# Patient Record
Sex: MALE | Race: WHITE | NOT HISPANIC OR LATINO | URBAN - METROPOLITAN AREA
[De-identification: names, ages, dates, MRNs, and addresses within clinical notes are randomized per-mention and may not be internally consistent; named-entity substitution may affect disease eponyms.]

---

## 2021-06-02 ENCOUNTER — INPATIENT (INPATIENT)
Facility: HOSPITAL | Age: 46
LOS: 3 days | Discharge: HOME | DRG: 64 | End: 2021-06-06
Attending: PSYCHIATRY & NEUROLOGY | Admitting: PSYCHIATRY & NEUROLOGY
Payer: COMMERCIAL

## 2021-06-02 VITALS
WEIGHT: 190.26 LBS | OXYGEN SATURATION: 96 % | DIASTOLIC BLOOD PRESSURE: 140 MMHG | HEART RATE: 108 BPM | RESPIRATION RATE: 18 BRPM | SYSTOLIC BLOOD PRESSURE: 229 MMHG | TEMPERATURE: 99 F

## 2021-06-02 LAB
ALBUMIN SERPL ELPH-MCNC: 4.5 G/DL — SIGNIFICANT CHANGE UP (ref 3.3–5)
ALP SERPL-CCNC: 107 U/L — SIGNIFICANT CHANGE UP (ref 40–120)
ALT FLD-CCNC: 68 U/L — HIGH (ref 10–45)
ANION GAP SERPL CALC-SCNC: 14 MMOL/L — SIGNIFICANT CHANGE UP (ref 5–17)
APTT BLD: 31 SEC — SIGNIFICANT CHANGE UP (ref 27.5–35.5)
AST SERPL-CCNC: 30 U/L — SIGNIFICANT CHANGE UP (ref 10–40)
BASOPHILS # BLD AUTO: 0.05 K/UL — SIGNIFICANT CHANGE UP (ref 0–0.2)
BASOPHILS NFR BLD AUTO: 0.5 % — SIGNIFICANT CHANGE UP (ref 0–2)
BILIRUB SERPL-MCNC: 0.4 MG/DL — SIGNIFICANT CHANGE UP (ref 0.2–1.2)
BUN SERPL-MCNC: 14 MG/DL — SIGNIFICANT CHANGE UP (ref 7–23)
CALCIUM SERPL-MCNC: 9.5 MG/DL — SIGNIFICANT CHANGE UP (ref 8.4–10.5)
CHLORIDE SERPL-SCNC: 102 MMOL/L — SIGNIFICANT CHANGE UP (ref 96–108)
CHOLEST SERPL-MCNC: 243 MG/DL — HIGH
CO2 SERPL-SCNC: 26 MMOL/L — SIGNIFICANT CHANGE UP (ref 22–31)
CREAT SERPL-MCNC: 1.01 MG/DL — SIGNIFICANT CHANGE UP (ref 0.5–1.3)
EOSINOPHIL # BLD AUTO: 0.17 K/UL — SIGNIFICANT CHANGE UP (ref 0–0.5)
EOSINOPHIL NFR BLD AUTO: 1.9 % — SIGNIFICANT CHANGE UP (ref 0–6)
GLUCOSE SERPL-MCNC: 101 MG/DL — HIGH (ref 70–99)
HCT VFR BLD CALC: 46.2 % — SIGNIFICANT CHANGE UP (ref 39–50)
HDLC SERPL-MCNC: 44 MG/DL — SIGNIFICANT CHANGE UP
HGB BLD-MCNC: 14.9 G/DL — SIGNIFICANT CHANGE UP (ref 13–17)
IMM GRANULOCYTES NFR BLD AUTO: 0.4 % — SIGNIFICANT CHANGE UP (ref 0–1.5)
INR BLD: 1.04 — SIGNIFICANT CHANGE UP (ref 0.88–1.16)
LIPID PNL WITH DIRECT LDL SERPL: 161 MG/DL — HIGH
LYMPHOCYTES # BLD AUTO: 2.94 K/UL — SIGNIFICANT CHANGE UP (ref 1–3.3)
LYMPHOCYTES # BLD AUTO: 32.2 % — SIGNIFICANT CHANGE UP (ref 13–44)
MCHC RBC-ENTMCNC: 27.1 PG — SIGNIFICANT CHANGE UP (ref 27–34)
MCHC RBC-ENTMCNC: 32.3 GM/DL — SIGNIFICANT CHANGE UP (ref 32–36)
MCV RBC AUTO: 84.2 FL — SIGNIFICANT CHANGE UP (ref 80–100)
MONOCYTES # BLD AUTO: 0.73 K/UL — SIGNIFICANT CHANGE UP (ref 0–0.9)
MONOCYTES NFR BLD AUTO: 8 % — SIGNIFICANT CHANGE UP (ref 2–14)
NEUTROPHILS # BLD AUTO: 5.2 K/UL — SIGNIFICANT CHANGE UP (ref 1.8–7.4)
NEUTROPHILS NFR BLD AUTO: 57 % — SIGNIFICANT CHANGE UP (ref 43–77)
NON HDL CHOLESTEROL: 199 MG/DL — HIGH
NRBC # BLD: 0 /100 WBCS — SIGNIFICANT CHANGE UP (ref 0–0)
PLATELET # BLD AUTO: 272 K/UL — SIGNIFICANT CHANGE UP (ref 150–400)
POTASSIUM SERPL-MCNC: 3.8 MMOL/L — SIGNIFICANT CHANGE UP (ref 3.5–5.3)
POTASSIUM SERPL-SCNC: 3.8 MMOL/L — SIGNIFICANT CHANGE UP (ref 3.5–5.3)
PROT SERPL-MCNC: 7.6 G/DL — SIGNIFICANT CHANGE UP (ref 6–8.3)
PROTHROM AB SERPL-ACNC: 12.5 SEC — SIGNIFICANT CHANGE UP (ref 10.6–13.6)
RBC # BLD: 5.49 M/UL — SIGNIFICANT CHANGE UP (ref 4.2–5.8)
RBC # FLD: 13.1 % — SIGNIFICANT CHANGE UP (ref 10.3–14.5)
SARS-COV-2 RNA SPEC QL NAA+PROBE: SIGNIFICANT CHANGE UP
SODIUM SERPL-SCNC: 142 MMOL/L — SIGNIFICANT CHANGE UP (ref 135–145)
TRIGL SERPL-MCNC: 190 MG/DL — HIGH
WBC # BLD: 9.13 K/UL — SIGNIFICANT CHANGE UP (ref 3.8–10.5)
WBC # FLD AUTO: 9.13 K/UL — SIGNIFICANT CHANGE UP (ref 3.8–10.5)

## 2021-06-02 PROCEDURE — 70496 CT ANGIOGRAPHY HEAD: CPT | Mod: 26,MC

## 2021-06-02 PROCEDURE — 70498 CT ANGIOGRAPHY NECK: CPT | Mod: 26,MC

## 2021-06-02 PROCEDURE — 71045 X-RAY EXAM CHEST 1 VIEW: CPT | Mod: 26

## 2021-06-02 PROCEDURE — 99291 CRITICAL CARE FIRST HOUR: CPT

## 2021-06-02 PROCEDURE — 70450 CT HEAD/BRAIN W/O DYE: CPT | Mod: 26,76,59

## 2021-06-02 PROCEDURE — 99255 IP/OBS CONSLTJ NEW/EST HI 80: CPT

## 2021-06-02 PROCEDURE — 99223 1ST HOSP IP/OBS HIGH 75: CPT

## 2021-06-02 RX ORDER — ACETAMINOPHEN 500 MG
650 TABLET ORAL EVERY 6 HOURS
Refills: 0 | Status: DISCONTINUED | OUTPATIENT
Start: 2021-06-02 | End: 2021-06-03

## 2021-06-02 RX ORDER — LABETALOL HCL 100 MG
10 TABLET ORAL ONCE
Refills: 0 | Status: COMPLETED | OUTPATIENT
Start: 2021-06-02 | End: 2021-06-02

## 2021-06-02 RX ORDER — SODIUM CHLORIDE 9 MG/ML
1000 INJECTION INTRAMUSCULAR; INTRAVENOUS; SUBCUTANEOUS
Refills: 0 | Status: DISCONTINUED | OUTPATIENT
Start: 2021-06-02 | End: 2021-06-03

## 2021-06-02 RX ORDER — LABETALOL HCL 100 MG
20 TABLET ORAL ONCE
Refills: 0 | Status: COMPLETED | OUTPATIENT
Start: 2021-06-02 | End: 2021-06-02

## 2021-06-02 RX ORDER — NICARDIPINE HYDROCHLORIDE 30 MG/1
5 CAPSULE, EXTENDED RELEASE ORAL
Qty: 40 | Refills: 0 | Status: DISCONTINUED | OUTPATIENT
Start: 2021-06-02 | End: 2021-06-02

## 2021-06-02 RX ORDER — LEVETIRACETAM 250 MG/1
500 TABLET, FILM COATED ORAL EVERY 12 HOURS
Refills: 0 | Status: DISCONTINUED | OUTPATIENT
Start: 2021-06-02 | End: 2021-06-03

## 2021-06-02 RX ORDER — LEVETIRACETAM 250 MG/1
500 TABLET, FILM COATED ORAL EVERY 12 HOURS
Refills: 0 | Status: DISCONTINUED | OUTPATIENT
Start: 2021-06-02 | End: 2021-06-02

## 2021-06-02 RX ADMIN — Medication 10 MILLIGRAM(S): at 14:58

## 2021-06-02 RX ADMIN — Medication 20 MILLIGRAM(S): at 13:55

## 2021-06-02 RX ADMIN — NICARDIPINE HYDROCHLORIDE 25 MG/HR: 30 CAPSULE, EXTENDED RELEASE ORAL at 14:22

## 2021-06-02 RX ADMIN — Medication 10 MILLIGRAM(S): at 14:43

## 2021-06-02 RX ADMIN — LEVETIRACETAM 400 MILLIGRAM(S): 250 TABLET, FILM COATED ORAL at 17:46

## 2021-06-02 RX ADMIN — SODIUM CHLORIDE 75 MILLILITER(S): 9 INJECTION INTRAMUSCULAR; INTRAVENOUS; SUBCUTANEOUS at 18:09

## 2021-06-02 NOTE — H&P ADULT - HISTORY OF PRESENT ILLNESS
This is a 46M with a history of hypertension who presented to the ED after experiencing sudden onset of left arm weakness and a left facial droop.  45 y/o M with PMhx of HTN, takes one medication which he does not remember the name of, no anticoagulation including no aspirin, but notes compliance with his medication. Patient also mentions the use of 4-5 beers per night. 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 EMS called in stroke notification and code stroke was called prior to arrival, taken immediately to CT scan. As per patient denies headache, blurry vison. Notes more trouble moving LUE than moving LLE, no neck pain, fever, infectious symptoms. Denies drug use.

## 2021-06-02 NOTE — CONSULT NOTE ADULT - ASSESSMENT
Pt is a 47 yo M w/ PMH of HTN (on unknown medication), who presented to ED BIBEMS prenotification for LVO stroke code for slurred speech, left sided weakness, and pt noted not to be at baseline by pt's co workers, symptoms started at 1325. Pt to be super hypertensive to the 220s/110s and was started on cardene drip. Neuro sx consulted and evaluated pt. Pt will be admitted under neurosurgery. HCT showed 3.3 cm hematoma centered in the R basal ganglia and age indeterminate R caudate head infarct. CTA negative.       recommend:    1)Secondary stroke prevention  - start Atorvastatin 80mg PO daily  -no anticoagulation/antiplatelets/DVT chemophrophylaxis at this time due to hemorrhage     2) Stroke risk factors  - A1C: obtain   - LDL: 161  - HTN    3) Further management  - continue cardene drip to maintain SBP goal <140  - recommend q1 hr stroke neuro checks  - may need outpt neurology follow up  - provide stroke education    Case seen and discussed with attending physician  Pt is a 45 yo M w/ PMH of HTN (on unknown medication), who presented to ED BIBEMS prenotification for LVO stroke code for slurred speech, left sided weakness, and pt noted not to be at baseline by pt's co workers, symptoms started at 1325. Pt to be super hypertensive to the 220s/110s and was started on cardene drip. Neuro sx consulted and evaluated pt. Pt will be admitted under neurosurgery. HCT showed 3.3 cm hematoma centered in the R basal ganglia and age indeterminate R caudate head infarct. CTA negative.   recommend:    1)Secondary stroke prevention  - start Atorvastatin 80mg PO daily  -no anticoagulation/antiplatelets/DVT chemophrophylaxis at this time due to hemorrhage     2) Stroke risk factors  - A1C: obtain   - LDL: 161  - HTN    3) Further management  - continue cardene drip to maintain SBP goal <140  - recommend q1 hr stroke neuro checks  - may need outpt neurology follow up  - provide stroke education  - appreciate neurosurgery recs    Thank you for allowing us to participate in the care of this patient, please call Neurology with questions as needed.     Case seen and discussed with attending physician  Pt is a 47 yo M w/ PMH of HTN (on unknown medication), who presented to ED BIBEMS prenotification for LVO stroke code for slurred speech, left sided weakness, and pt noted not to be at baseline by pt's co workers, symptoms started at 1325. Pt to be super hypertensive to the 220s/110s and was started on cardene drip. Neuro sx consulted and evaluated pt. Pt will be admitted under neurosurgery. HCT showed 3.3 cm hematoma centered in the R basal ganglia and age indeterminate R caudate head infarct. CTA negative.   recommend:    Right putamen hemorrhage from uncontrolled HTN, asymptomatic old right caudate lacune, b/l V4 mild stenosis/athero    1) Management per primary neurosurgery team  - continue cardene drip to maintain SBP goal <140  - recommend q1 hr stroke neuro checks  - check TTE     2)Secondary stroke prevention  - start Atorvastatin 80mg PO daily  -no anticoagulation/antiplatelets/DVT chemophrophylaxis at this time due to hemorrhage (when cleared from neurosurgical standpoint-possibly in 1 week-patient needs to be started on aspirin 81 for prior ischemic stroke & b/l vertebral artery mild stenosis)    3) Stroke risk factors  - A1C: obtain   - LDL: 161  - HTN  - provide stroke education  - will need outpt neurology follow up  - OP vascular risk factor mgt with PCP (goal BP<120/80 with ACEI+/-CCB’s, LDL bet 50 & 70, A1C<7.0, avoid EtOH)    Thank you for allowing us to participate in the care of this patient, will follow intermittently, please call Neurology with questions as needed.     Case seen and discussed with attending physician

## 2021-06-02 NOTE — H&P ADULT - NSHPPHYSICALEXAM_GEN_ALL_CORE
General: Laying in bed. Uncomfortable appearing.   Eyes: PERRL. EOMI bilaterally   Neck: Supple  Lungs: CTAB  Heart: RRR. S1, S2  Abdomen: Soft, NT ND +BS  Ext: 2+ pulses and perfusing well  Skin: No rash  Neuro: AAOx 3, LEOS x4 with full 5/5 strength upper and lower extremities throughout. +L pronator drift +L facial. LUE diminished sensation to light touch.

## 2021-06-02 NOTE — ED PROVIDER NOTE - OBJECTIVE STATEMENT
47 y/o M with PMhx of HTN, takes one medication which he does not remember the name of, no anticoagulation including no aspirin, but notes compliance with his medication. Patient also mentions the use of 4-5 beers per night. 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 EMS called in stroke notification and code stroke was called prior to arrival, taken immediately to CT scan. As per patient denies headache, blurry vison. Notes more trouble moving LUE than moving LLE, no neck pain, fever, infectious symptoms. Denies drug use.

## 2021-06-02 NOTE — PROGRESS NOTE ADULT - ASSESSMENT
NEURO:  admit to ICU q 1 hr neuro checks  Seizure Prophylaxis: none  Activity: [x] PT [x] OT [] PMNR    PULM:  IS      CV:  SBP goal < 160 mmHg  Nicardipine gtt PRN    RENAL:  Fluids:   Subramanian for strict I/Os    GI:  Diet: dysphagia adat   GI prophylaxis [] not indicated   Bowel regimen  [x] senna [] other:    ENDO:   Goal euglycemia (-180)  Check HbA1c and lipids      HEME/ONC:  VTE prophylaxis: [x] SCDs [] chemoprophylaxis [x] hold chemoprophylaxis     ID:  Monitor for fever    SOCIAL/FAMILY:   [x] family meeting     CODE STATUS:  [x] Full Code     DISPOSITION:  [x] ICU     [x] Patient is at high risk of neurologic deterioration/death due to: increased intracranial pressure, re-hemorrhage, respiratory failure    Time spent: 45 critical care minutes

## 2021-06-02 NOTE — H&P ADULT - NSHPLABSRESULTS_GEN_ALL_CORE
CT Brain Stroke Protocol (06.02.21 @ 14:10) >    IMPRESSION:    CT Angio Head w/ IV Cont (06.02.21 @ 14:11) >    IMPRESSION: Normal CTA of the neck.

## 2021-06-02 NOTE — H&P ADULT - ATTENDING COMMENTS
Patient seen and examined by me 6/2/2021. Presents with left facial droop, dysarthria, left arm weakness. History of hypertension. CT head showing small ~10 cc right basal ganglia ICH. Plan for admit to ICU, SBP<140, a-line, CTA head to rule out vascular etiology, repeat CT head 6 hrs. No plan for surgical intervention at this time.    Yoav Ledesma M.D.

## 2021-06-02 NOTE — ED PROVIDER NOTE - NEUROLOGICAL, MLM
L facial droop, L arm 3/5 strength +2 drift, LLE 4/5 strength, +1 drift. Sensations decreased to L upper compared to R upper, although sensations equal to LE.

## 2021-06-02 NOTE — ED ADULT NURSE NOTE - OBJECTIVE STATEMENT
Pt AOX4. Pt BIBEMS for left sided facial droop, left sided decreased sensation, left sided arm drift. Pt denies fevers, chills, headache, changes in vision, n/v, SOB, chest pain, dizziness. Stroke code activated by EMS. Pt placed on cardiac monitor and brought to CT upon arrival to ED. Pt denies taking blood thinners. Pt speaking in full complete sentences. Respirations even and unlabored.

## 2021-06-02 NOTE — ED PROVIDER NOTE - PRINCIPAL DIAGNOSIS
I've placed CAM orders for B12 administration.  Okay for in clinic administration of B12.      Virginia Craig, CNP     Intracranial bleed

## 2021-06-02 NOTE — PROGRESS NOTE ADULT - SUBJECTIVE AND OBJECTIVE BOX
NEUROCRITICAL CARE PROGRESS NOTE    ISH GRAYSON   MRN-[id number]  Summary:  /  HPI:  45 y/o M with PMhx of HTN, takes one medication which he does not remember the name of, no anticoagulation including no aspirin, but notes compliance with his medication. Patient also mentions the use of 4-5 beers per night. 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 EMS called in stroke notification and code stroke was called prior to arrival, taken immediately to CT scan. As per patient denies headache, blurry vison. Notes more trouble moving LUE than moving LLE, no neck pain, fever, infectious symptoms. Denies drug use. (02 Jun 2021 15:37)          Vital Signs Last 24 Hrs  T(C): 36.2 (02 Jun 2021 18:01), Max: 37.1 (02 Jun 2021 13:56)  T(F): 97.2 (02 Jun 2021 18:01), Max: 98.7 (02 Jun 2021 13:56)  HR: 78 (02 Jun 2021 20:00) (72 - 108)  BP: 133/77 (02 Jun 2021 20:00) (118/58 - 229/140)  BP(mean): 98 (02 Jun 2021 20:00) (83 - 98)  RR: 28 (02 Jun 2021 20:00) (18 - 31)  SpO2: 93% (02 Jun 2021 20:00) (93% - 99%)        I&O's Detail    02 Jun 2021 07:01  -  02 Jun 2021 20:50  --------------------------------------------------------  IN:    IV PiggyBack: 100 mL    NiCARdipine: 50 mL    sodium chloride 0.9%: 150 mL  Total IN: 300 mL    OUT:    Voided (mL): 600 mL  Total OUT: 600 mL    Total NET: -300 mL          LABS:                        14.9   9.13  )-----------( 272      ( 02 Jun 2021 14:10 )             46.2     06-02    142  |  102  |  14  ----------------------------<  101<H>  3.8   |  26  |  1.01    Ca    9.5      02 Jun 2021 14:10    TPro  7.6  /  Alb  4.5  /  TBili  0.4  /  DBili  x   /  AST  30  /  ALT  68<H>  /  AlkPhos  107  06-02    PT/INR - ( 02 Jun 2021 14:15 )   PT: 12.5 sec;   INR: 1.04          PTT - ( 02 Jun 2021 14:15 )  PTT:31.0 sec        CAPILLARY BLOOD GLUCOSE  97 (02 Jun 2021 14:48)      POCT Blood Glucose.: 97 mg/dL (02 Jun 2021 13:53)      Drug Levels: [] N/A    CSF Analysis: [] N/A      Allergies    No Known Allergies    Intolerances      MEDICATIONS:  Antibiotics:    Neuro:  acetaminophen   Tablet .. 650 milliGRAM(s) Oral every 6 hours PRN  levETIRAcetam  IVPB 500 milliGRAM(s) IV Intermittent every 12 hours    Anticoagulation:    OTHER:    IVF:  sodium chloride 0.9%. 1000 milliLiter(s) IV Continuous <Continuous>    CULTURES:        NIHSS 3

## 2021-06-02 NOTE — ED PROVIDER NOTE - CLINICAL SUMMARY MEDICAL DECISION MAKING FREE TEXT BOX
47 y/o M with L sided weakness, facial droop, code stroke called prior to arrival. Upon arrival patient with BPs of 240s/140s, Labetalol 20 IV pushed on table prior to scan. CT scan obtained showing bleed to R basal ganglia so tpa not given 2ndary to intercranial bleed. Cardene drip started with goals to lower blood pressure to 140s. neurosurgery immediately called and at bedside, likely will admit to neurosurgery. Plan for HTN management and dispo as per neurosurg. 47 y/o M with L sided weakness, facial droop, code stroke called prior to arrival. Upon arrival patient with BPs of 240s/140s, Labetalol 20 IV pushed on table prior to scan. CT scan obtained showing bleed to R basal ganglia so tpa not given 2ndary to intercranial bleed. Cardene drip started with goals to lower blood pressure to 140s. neurosurgery immediately called and at bedside, likely will admit to neurosurgery. Plan for HTN management and dispo as per neurosurg.    Pt admitted to NS for further mgmt.

## 2021-06-02 NOTE — CONSULT NOTE ADULT - SUBJECTIVE AND OBJECTIVE BOX
**STROKE CODE CONSULT NOTE**    Last known well time/Time of onset of symptoms: 1325 6/2     HPI: Pt is a 47 yo M w/ PMH of HTN (on medication, unknown name), who presented to ED BIBEMS prenotification LVO stroke code for slurred speech, left sided weakness, and pt noted not to be at baseline by pt's co workers and stated symptoms started at 1325. Pt denied use of anticoagulation or other blood thinning medication, recent major surgery, or head trauma. In ambulance, BP was 213/117, in CT scanner BP was 229/140 so 20 mg labetalol push was given at 1357 in anticipation of Alteplase, prior to known bleed. BS 99. Pt A&O x3, and stated he was experiencing weakness in LUE, decreased sensation loss on the left side. Once bleed was confirmed, decision to start cardene drip was started. Neuro sx consulted and evaluated pt. Pt will be admitted under neurosurgery. Pt states he denies acute onset of numbness, weakness, tingling, slurred speech, blurry vision, double vision, dizziness, headache, nausea, chest pain, SOB, or other symptoms. HCT showed 3.3 cm hematoma centered in the R basal ganglia and age indeterminate R caudate head infarct. CTA negative.     T(C): --  HR: 90 (06-02-21 @ 14:53) (90 - 108)  BP: 151/71 (06-02-21 @ 14:53) (147/78 - 229/140)  RR: 18 (06-02-21 @ 14:48) (18 - 18)  SpO2: 95% (06-02-21 @ 14:53) (95% - 97%)    PAST MEDICAL & SURGICAL HISTORY:      FAMILY HISTORY:      SOCIAL HISTORY:   Patient lives with *** at ***.   Smoking status:  Drinking:  Drug Use:     ROS: ***  Constitutional: No fever, weight loss or fatigue  Eyes: No eye pain, visual disturbances, or discharge  ENMT:  No difficulty hearing, tinnitus; No sinus or throat pain  Neck: No pain or stiffness  Respiratory: No cough,   Cardiovascular: No chest pain, palpitations, shortness of breath, or leg swelling  Neurological: As per HPI      MEDICATIONS  (STANDING):  niCARdipine Infusion 5 mG/Hr (25 mL/Hr) IV Continuous <Continuous>    MEDICATIONS  (PRN):    Allergies    No Known Allergies    Intolerances      Vital Signs Last 24 Hrs  T(C): --  T(F): --  HR: 90 (02 Jun 2021 14:53) (90 - 108)  BP: 151/71 (02 Jun 2021 14:53) (147/78 - 229/140)  BP(mean): --  RR: 18 (02 Jun 2021 14:48) (18 - 18)  SpO2: 95% (02 Jun 2021 14:53) (95% - 97%)    Physical exam:  Constitutional: No acute distress, conversant  Eyes: Anicteric sclerae, moist conjunctivae, see below for CNs  Neck: trachea midline  Extremities: no edema    Neurologic:  -Mental status: Awake, alert, oriented to person, place, and time. Speech is fluent with intact naming, repetition, and comprehension, + mild dysarthria. Recent and remote memory intact. Follows commands. Attention/concentration intact.   -Cranial nerves:   II: Visual fields are full to confrontation.  III, IV, VI: Extraocular movements are intact without nystagmus. Pupils equally round and reactive to light  V:  Decreased left facial sensation V1-V3  VII: Left facial droop   VIII: Gross hearing intact   Motor: Normal bulk and tone. Left UE drift. Strength bilateral RUE 5/5, LUE 4/5, bilateral lower extremities 5/5.  Sensation: Intact to light touch bilaterally. + extinction on double simultaneous testing.  Coordination: ataxia on LUE which can be attributed to weakness     NIHSS: 5 ASPECT Score: 4 ICH Score: 0     Fingerstick Blood Glucose: CAPILLARY BLOOD GLUCOSE  97 (02 Jun 2021 14:48)      POCT Blood Glucose.: 97 mg/dL (02 Jun 2021 13:53)    LABS:                        14.9   9.13  )-----------( 272      ( 02 Jun 2021 14:10 )             46.2     06-02    142  |  102  |  14  ----------------------------<  101<H>  3.8   |  26  |  1.01    Ca    9.5      02 Jun 2021 14:10    TPro  7.6  /  Alb  4.5  /  TBili  0.4  /  DBili  x   /  AST  30  /  ALT  68<H>  /  AlkPhos  107  06-02    PT/INR - ( 02 Jun 2021 14:15 )   PT: 12.5 sec;   INR: 1.04          PTT - ( 02 Jun 2021 14:15 )  PTT:31.0 sec          RADIOLOGY & ADDITIONAL STUDIES:  < from: CT Brain Stroke Protocol (06.02.21 @ 14:10) >  IMPRESSION:  There is a 3.3 cm hematoma centered in the right basal ganglia. In addition, there is an age-indeterminate infarction right caudate head.    < end of copied text >      < from: CT Angio Neck w/ IV Cont (06.02.21 @ 14:12) >  arteriovenous shunting.    IMPRESSION: No large vessel occlusion. Mild narrowing involving the left V4 segment.    < end of copied text >  < from: CT Angio Neck w/ IV Cont (06.02.21 @ 14:12) >    IMPRESSION: Normal CTA of the neck.    < end of copied text >      -----------------------------------------------------------------------------------------------------------------  IV-tPA (Y/N):   no                                Reason IV-tPA not given: bleed         **STROKE CODE CONSULT NOTE**    Last known well time/Time of onset of symptoms: 1325 6/2     HPI: Pt is a 45 yo M w/ PMH of HTN (on medication, unknown name), ETOH use (4-5 beers nightly) who presented to ED BIBEMS prenotification LVO stroke code for slurred speech, left sided weakness. 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 On further exam he notice patient as had left facial droop and left arm weakness. Pt denied use of anticoagulation or other blood thinning medication, recent major surgery, or head trauma. In ambulance, BP was 213/117, in CT scanner BP was 229/140 so 20 mg labetalol push was given at 1357 in anticipation of Alteplase, prior to known bleed. BS 99. Pt A&O x3, and stated he was experiencing weakness in LUE, decreased sensation loss on the left side. Once bleed was confirmed, cardene drip was started. Neuro sx consulted and evaluated pt. Pt will be admitted under neurosurgery. Pt states he denies acute onset of numbness, tingling, blurry vision, double vision, dizziness, headache, nausea, chest pain, SOB, or other symptoms. HCT showed 3.3 cm hematoma centered in the R basal ganglia and age indeterminate R caudate head infarct. CTA negative.     Wife So ([phone]) was contacted by BRAXTON Castro. Discussed patient's diagnosis and prognosis, wife aware neurosurgery is on board.     T(C): --  HR: 90 (21 @ 14:53) (90 - 108)  BP: 151/71 (21 @ 14:53) (147/78 - 229/140)  RR: 18 (21 @ 14:48) (18 - 18)  SpO2: 95% (21 @ 14:53) (95% - 97%)    PAST MEDICAL & SURGICAL HISTORY:      FAMILY HISTORY:      SOCIAL HISTORY:   Patient lives with wife So at home.   Smoking status: denies  Drinkin-5 beers per night  Drug Use: denies    ROS:   Constitutional: No fever, weight loss or fatigue  Eyes: No eye pain, visual disturbances, or discharge  ENMT:  No difficulty hearing, tinnitus; No sinus or throat pain  Neck: No pain or stiffness  Respiratory: No cough,   Cardiovascular: No chest pain, palpitations, shortness of breath, or leg swelling  Neurological: As per HPI      MEDICATIONS  (STANDING):  niCARdipine Infusion 5 mG/Hr (25 mL/Hr) IV Continuous <Continuous>    MEDICATIONS  (PRN):    Allergies    No Known Allergies    Intolerances      Vital Signs Last 24 Hrs  T(C): --  T(F): --  HR: 90 (2021 14:53) (90 - 108)  BP: 151/71 (2021 14:53) (147/78 - 229/140)  BP(mean): --  RR: 18 (2021 14:48) (18 - 18)  SpO2: 95% (2021 14:53) (95% - 97%)    Physical exam:  Constitutional: No acute distress, conversant  Eyes: Anicteric sclerae, moist conjunctivae, see below for CNs  Neck: trachea midline  Extremities: no edema    Neurologic:  -Mental status: Awake, alert, oriented to person, place, and time. Speech is fluent with intact naming, repetition, and comprehension, + mild dysarthria. Recent and remote memory intact. Follows commands. Attention/concentration intact.   -Cranial nerves:   II: Visual fields are full to confrontation.  III, IV, VI: Extraocular movements are intact without nystagmus. Pupils equally round and reactive to light  V:  Decreased left facial sensation V1-V3  VII: Left facial droop   VIII: Gross hearing intact   Motor: Normal bulk and tone. Left UE drift. Strength bilateral RUE 5/5, LUE 4/5, bilateral lower extremities 5/5.  Sensation: Intact to light touch bilaterally. + extinction on double simultaneous testing.  Coordination: ataxia on LUE which can be attributed to weakness     NIHSS: 5 ASPECT Score: 4 ICH Score: 0     Fingerstick Blood Glucose: CAPILLARY BLOOD GLUCOSE  97 (2021 14:48)      POCT Blood Glucose.: 97 mg/dL (2021 13:53)    LABS:                        14.9   9.13  )-----------( 272      ( 2021 14:10 )             46.2     06-02    142  |  102  |  14  ----------------------------<  101<H>  3.8   |  26  |  1.01    Ca    9.5      2021 14:10    TPro  7.6  /  Alb  4.5  /  TBili  0.4  /  DBili  x   /  AST  30  /  ALT  68<H>  /  AlkPhos  107  06-02    PT/INR - ( 2021 14:15 )   PT: 12.5 sec;   INR: 1.04          PTT - ( 2021 14:15 )  PTT:31.0 sec          RADIOLOGY & ADDITIONAL STUDIES:  < from: CT Brain Stroke Protocol (21 @ 14:10) >  IMPRESSION:  There is a 3.3 cm hematoma centered in the right basal ganglia. In addition, there is an age-indeterminate infarction right caudate head.    < end of copied text >      < from: CT Angio Neck w/ IV Cont (21 @ 14:12) >  arteriovenous shunting.    IMPRESSION: No large vessel occlusion. Mild narrowing involving the left V4 segment.    < end of copied text >  < from: CT Angio Neck w/ IV Cont (21 @ 14:12) >    IMPRESSION: Normal CTA of the neck.    < end of copied text >      -----------------------------------------------------------------------------------------------------------------  IV-tPA (Y/N):   no                                Reason IV-tPA not given: bleed         **STROKE CODE CONSULT NOTE**    Last known well time/Time of onset of symptoms: 1325 6/2     HPI: Pt is a 47 yo M w/ PMH of HTN (on medication, unknown name), ETOH use (4-5 beers nightly) who presented to ED BIBEMS prenotification LVO stroke code for slurred speech, left sided weakness. 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 On further exam he notice patient as had left facial droop and left arm weakness. Pt denied use of anticoagulation or other blood thinning medication, recent major surgery, or head trauma. In ambulance, BP was 213/117, in CT scanner BP was 229/140 so 20 mg labetalol push was given at 1357 in anticipation of Alteplase, prior to known bleed. BS 99. Pt A&O x3, and stated he was experiencing weakness in LUE, decreased sensation loss on the left side. Once bleed was confirmed, cardene drip was started. Neuro sx consulted and evaluated pt. Pt will be admitted under neurosurgery. Pt states he denies acute onset of numbness, tingling, blurry vision, double vision, dizziness, headache, nausea, chest pain, SOB, or other symptoms. HCT showed 3.3 cm hematoma centered in the R basal ganglia and age indeterminate R caudate head infarct. CTA negative.     Wife So ([phone]) was contacted by BRAXTON Castro. Discussed patient's diagnosis and prognosis, wife aware neurosurgery is on board.     T(C): --  HR: 90 (21 @ 14:53) (90 - 108)  BP: 151/71 (21 @ 14:53) (147/78 - 229/140)  RR: 18 (21 @ 14:48) (18 - 18)  SpO2: 95% (21 @ 14:53) (95% - 97%)    PAST MEDICAL & SURGICAL HISTORY:      FAMILY HISTORY:      SOCIAL HISTORY:   Patient lives with wife So at home.   Smoking status: denies  Drinkin-5 beers per night  Drug Use: denies    ROS:   Constitutional: No fever, weight loss or fatigue  Eyes: No eye pain, visual disturbances, or discharge  ENMT:  No difficulty hearing, tinnitus; No sinus or throat pain  Neck: No pain or stiffness  Respiratory: No cough,   Cardiovascular: No chest pain, palpitations, shortness of breath, or leg swelling  Neurological: As per HPI      MEDICATIONS  (STANDING):  niCARdipine Infusion 5 mG/Hr (25 mL/Hr) IV Continuous <Continuous>    MEDICATIONS  (PRN):    Allergies    No Known Allergies    Intolerances      Vital Signs Last 24 Hrs  T(C): --  T(F): --  HR: 90 (2021 14:53) (90 - 108)  BP: 151/71 (2021 14:53) (147/78 - 229/140)  BP(mean): --  RR: 18 (2021 14:48) (18 - 18)  SpO2: 95% (2021 14:53) (95% - 97%)    Physical exam:  Constitutional: No acute distress, conversant  Eyes: Anicteric sclerae, moist conjunctivae, see below for CNs  Neck: trachea midline  Extremities: no edema    Neurologic:  -Mental status: Awake, alert, oriented to person, place, and time. Speech is fluent with intact naming, repetition, and comprehension, + mild dysarthria. Recent and remote memory intact. Follows commands. Attention/concentration intact.   -Cranial nerves:   II: Visual fields are full to confrontation.  III, IV, VI: Extraocular movements are intact without nystagmus. Pupils equally round and reactive to light  V:  Decreased left facial sensation V1-V3  VII: Left facial droop   VIII: Gross hearing intact   Motor: Normal bulk and tone. Left UE drift. Strength bilateral RUE 5/5, LUE 4/5, bilateral lower extremities 5/5.  Sensation: Intact to light touch bilaterally. + extinction on double simultaneous testing.  Coordination: ataxia on LUE which can be attributed to weakness     NIHSS: 5 ASPECT Score: 9 ICH Score: 0     Fingerstick Blood Glucose: CAPILLARY BLOOD GLUCOSE  97 (2021 14:48)      POCT Blood Glucose.: 97 mg/dL (2021 13:53)    LABS:                        14.9   9.13  )-----------( 272      ( 2021 14:10 )             46.2     06-02    142  |  102  |  14  ----------------------------<  101<H>  3.8   |  26  |  1.01    Ca    9.5      2021 14:10    TPro  7.6  /  Alb  4.5  /  TBili  0.4  /  DBili  x   /  AST  30  /  ALT  68<H>  /  AlkPhos  107  06-02    PT/INR - ( 2021 14:15 )   PT: 12.5 sec;   INR: 1.04          PTT - ( 2021 14:15 )  PTT:31.0 sec          RADIOLOGY & ADDITIONAL STUDIES:  < from: CT Brain Stroke Protocol (21 @ 14:10) >  IMPRESSION:  There is a 3.3 cm hematoma centered in the right basal ganglia. In addition, there is an age-indeterminate infarction right caudate head.    < end of copied text >      < from: CT Angio Neck w/ IV Cont (21 @ 14:12) >  arteriovenous shunting.    IMPRESSION: No large vessel occlusion. Mild narrowing involving the left V4 segment.    < end of copied text >  < from: CT Angio Neck w/ IV Cont (21 @ 14:12) >    IMPRESSION: Normal CTA of the neck.    < end of copied text >      -----------------------------------------------------------------------------------------------------------------  IV-tPA (Y/N):   no                                Reason IV-tPA not given: bleed         **STROKE CODE CONSULT NOTE**    Last known well time/Time of onset of symptoms: 1325 6/2     HPI: Pt is a 45 yo M w/ PMH of HTN (on medication, unknown name), ETOH use (4-5 beers nightly) who presented to ED BIBEMS prenotification LVO stroke code for slurred speech, left sided weakness. 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 On further exam he notice patient as had left facial droop and left arm weakness. Pt denied use of anticoagulation or other blood thinning medication, recent major surgery, or head trauma. In ambulance, BP was 213/117, in CT scanner BP was 229/140 so 20 mg labetalol push was given at 1357 in anticipation of Alteplase(not given due to bleed on CTH), prior to known bleed. BS 99. Pt A&O x3, and stated he was experiencing weakness in LUE, decreased sensation loss on the left side. Once bleed was confirmed, cardene drip was started. Neuro sx consulted and evaluated pt. Pt will be admitted under neurosurgery. Pt states he denies acute onset of numbness, tingling, blurry vision, double vision, dizziness, headache, nausea, chest pain, SOB, or other symptoms. HCT showed 3.3 cm hematoma centered in the R basal ganglia and age indeterminate R caudate head infarct. CTA negative.     Wife So ([phone]) was contacted by BRAXTON Castro. Discussed patient's diagnosis and prognosis, wife aware neurosurgery is on board.     T(C): --  HR: 90 (21 @ 14:53) (90 - 108)  BP: 151/71 (21 @ 14:53) (147/78 - 229/140)  RR: 18 (21 @ 14:48) (18 - 18)  SpO2: 95% (21 @ 14:53) (95% - 97%)    PAST MEDICAL & SURGICAL HISTORY:      FAMILY HISTORY:      SOCIAL HISTORY:   Patient lives with wife So at home.   Smoking status: denies  Drinkin-5 beers per night  Drug Use: denies    ROS:   Constitutional: No fever, weight loss or fatigue  Eyes: No eye pain, visual disturbances, or discharge  ENMT:  No difficulty hearing, tinnitus; No sinus or throat pain  Neck: No pain or stiffness  Respiratory: No cough,   Cardiovascular: No chest pain, palpitations, shortness of breath, or leg swelling  Neurological: As per HPI      MEDICATIONS  (STANDING):  niCARdipine Infusion 5 mG/Hr (25 mL/Hr) IV Continuous <Continuous>    MEDICATIONS  (PRN):    Allergies    No Known Allergies    Intolerances      Vital Signs Last 24 Hrs  T(C): --  T(F): --  HR: 90 (2021 14:53) (90 - 108)  BP: 151/71 (2021 14:53) (147/78 - 229/140)  BP(mean): --  RR: 18 (2021 14:48) (18 - 18)  SpO2: 95% (2021 14:53) (95% - 97%)    Physical exam:  Constitutional: No acute distress, conversant  Eyes: Anicteric sclerae, moist conjunctivae, see below for CNs  Neck: trachea midline  Extremities: no edema    Neurologic:  -Mental status: Awake, alert, oriented to person, place, and time. Speech is fluent with intact naming, repetition, and comprehension, + mild dysarthria. Recent and remote memory intact. Follows commands. Attention/concentration intact.   -Cranial nerves:   II: Visual fields are full to confrontation.  III, IV, VI: Extraocular movements are intact without nystagmus. Pupils equally round and reactive to light  V:  Decreased left facial sensation V1-V3  VII: Left facial droop   VIII: Gross hearing intact   Motor: Normal bulk and tone. Left UE drift. Strength bilateral RUE 5/5, LUE 4/5, bilateral lower extremities 5/5.  Sensation: Intact to light touch bilaterally. + extinction on double simultaneous testing.  Coordination: ataxia on LUE which can be attributed to weakness     NIHSS: 5 ASPECT Score: 9 ICH Score: 0     Fingerstick Blood Glucose: CAPILLARY BLOOD GLUCOSE  97 (2021 14:48)      POCT Blood Glucose.: 97 mg/dL (2021 13:53)    LABS:                        14.9   9.13  )-----------( 272      ( 2021 14:10 )             46.2     06-02    142  |  102  |  14  ----------------------------<  101<H>  3.8   |  26  |  1.01    Ca    9.5      2021 14:10    TPro  7.6  /  Alb  4.5  /  TBili  0.4  /  DBili  x   /  AST  30  /  ALT  68<H>  /  AlkPhos  107  06-02    PT/INR - ( 2021 14:15 )   PT: 12.5 sec;   INR: 1.04          PTT - ( 2021 14:15 )  PTT:31.0 sec          RADIOLOGY & ADDITIONAL STUDIES:  < from: CT Brain Stroke Protocol (21 @ 14:10) >  IMPRESSION:  There is a 3.3 cm hematoma centered in the right basal ganglia. In addition, there is an age-indeterminate infarction right caudate head.    < end of copied text >      < from: CT Angio Neck w/ IV Cont (21 @ 14:12) >  arteriovenous shunting.    IMPRESSION: No large vessel occlusion. Mild narrowing involving the left V4 segment.    < end of copied text >  < from: CT Angio Neck w/ IV Cont (21 @ 14:12) >    IMPRESSION: Normal CTA of the neck.    < end of copied text >      -----------------------------------------------------------------------------------------------------------------  IV-tPA (Y/N):   no                                Reason IV-tPA not given: bleed

## 2021-06-02 NOTE — ED ADULT TRIAGE NOTE - CHIEF COMPLAINT QUOTE
BIBEMS for L arm drift and L facial droop, LVO stroke notification by EMS, stroke code called PTA and pt brought immediately to CT scan. fs 97 in ED.

## 2021-06-02 NOTE — H&P ADULT - ASSESSMENT
46M hx of HTN who came through the ED BIBEMS for left upper extremity weakness and left facial droop. CTH showing a 3.3cm hematoma of the right basal ganglia. Admitted to NSICU for close neuromonitoring.     PLAN:  Admit to Neuro ICU  Neuro checks/vital checks q1h  Keppra 500BID  Pain control as needed  SBP <140mmHg  -Currently on cardene drip to maintain this pressure   Tolerating RA well  Obtain stability CTH in 6 hours (8:00pm)     46M hx of HTN who came through the ED BIBEMS for left upper extremity weakness and left facial droop. CTH showing a 3.3cm hematoma of the right basal ganglia. Admitted to NSICU for close neuromonitoring.     PLAN:  Admit to Neuro ICU  Neuro checks/vital checks q1h  Keppra 500BID  Pain control as needed  SBP <140mmHg  -Currently on cardene drip to maintain this pressure   Tolerating RA well  Obtain stability CTH in 6 hours (8:00pm)      Plan d/w Dr. Ledesma  46M hx of HTN who came through the ED BIBEMS for left upper extremity weakness and left facial droop. CTH showing a 3.3cm hematoma of the right basal ganglia. Admitted to NSICU for close neuromonitoring.     PLAN:  Admit to Neuro ICU  Neuro checks/vital checks q1h  Keppra 500BID  Stroke core measures  Pain control as needed  SBP <140mmHg  -Currently on cardene drip to maintain this pressure   Tolerating RA well  Obtain stability CTH in 6 hours (8:00pm)      Plan d/w Dr. Ledesma

## 2021-06-03 LAB
A1C WITH ESTIMATED AVERAGE GLUCOSE RESULT: 6.1 % — HIGH (ref 4–5.6)
ANION GAP SERPL CALC-SCNC: 10 MMOL/L — SIGNIFICANT CHANGE UP (ref 5–17)
BLD GP AB SCN SERPL QL: NEGATIVE — SIGNIFICANT CHANGE UP
BUN SERPL-MCNC: 13 MG/DL — SIGNIFICANT CHANGE UP (ref 7–23)
CALCIUM SERPL-MCNC: 9 MG/DL — SIGNIFICANT CHANGE UP (ref 8.4–10.5)
CHLORIDE SERPL-SCNC: 103 MMOL/L — SIGNIFICANT CHANGE UP (ref 96–108)
CO2 SERPL-SCNC: 25 MMOL/L — SIGNIFICANT CHANGE UP (ref 22–31)
COVID-19 SPIKE DOMAIN AB INTERP: NEGATIVE — SIGNIFICANT CHANGE UP
COVID-19 SPIKE DOMAIN ANTIBODY RESULT: 0.4 U/ML — SIGNIFICANT CHANGE UP
CREAT SERPL-MCNC: 0.9 MG/DL — SIGNIFICANT CHANGE UP (ref 0.5–1.3)
ESTIMATED AVERAGE GLUCOSE: 128 MG/DL — HIGH (ref 68–114)
GLUCOSE SERPL-MCNC: 110 MG/DL — HIGH (ref 70–99)
HCT VFR BLD CALC: 45.1 % — SIGNIFICANT CHANGE UP (ref 39–50)
HGB BLD-MCNC: 14.5 G/DL — SIGNIFICANT CHANGE UP (ref 13–17)
MAGNESIUM SERPL-MCNC: 2.3 MG/DL — SIGNIFICANT CHANGE UP (ref 1.6–2.6)
MCHC RBC-ENTMCNC: 27.2 PG — SIGNIFICANT CHANGE UP (ref 27–34)
MCHC RBC-ENTMCNC: 32.2 GM/DL — SIGNIFICANT CHANGE UP (ref 32–36)
MCV RBC AUTO: 84.5 FL — SIGNIFICANT CHANGE UP (ref 80–100)
NRBC # BLD: 0 /100 WBCS — SIGNIFICANT CHANGE UP (ref 0–0)
PHOSPHATE SERPL-MCNC: 3.5 MG/DL — SIGNIFICANT CHANGE UP (ref 2.5–4.5)
PLATELET # BLD AUTO: 263 K/UL — SIGNIFICANT CHANGE UP (ref 150–400)
POTASSIUM SERPL-MCNC: 4 MMOL/L — SIGNIFICANT CHANGE UP (ref 3.5–5.3)
POTASSIUM SERPL-SCNC: 4 MMOL/L — SIGNIFICANT CHANGE UP (ref 3.5–5.3)
RBC # BLD: 5.34 M/UL — SIGNIFICANT CHANGE UP (ref 4.2–5.8)
RBC # FLD: 13.3 % — SIGNIFICANT CHANGE UP (ref 10.3–14.5)
RH IG SCN BLD-IMP: POSITIVE — SIGNIFICANT CHANGE UP
SARS-COV-2 IGG+IGM SERPL QL IA: 0.4 U/ML — SIGNIFICANT CHANGE UP
SARS-COV-2 IGG+IGM SERPL QL IA: NEGATIVE — SIGNIFICANT CHANGE UP
SODIUM SERPL-SCNC: 138 MMOL/L — SIGNIFICANT CHANGE UP (ref 135–145)
TSH SERPL-MCNC: 1.57 UIU/ML — SIGNIFICANT CHANGE UP (ref 0.27–4.2)
WBC # BLD: 7.91 K/UL — SIGNIFICANT CHANGE UP (ref 3.8–10.5)
WBC # FLD AUTO: 7.91 K/UL — SIGNIFICANT CHANGE UP (ref 3.8–10.5)

## 2021-06-03 PROCEDURE — 99232 SBSQ HOSP IP/OBS MODERATE 35: CPT

## 2021-06-03 PROCEDURE — 99233 SBSQ HOSP IP/OBS HIGH 50: CPT

## 2021-06-03 RX ORDER — LABETALOL HCL 100 MG
10 TABLET ORAL ONCE
Refills: 0 | Status: DISCONTINUED | OUTPATIENT
Start: 2021-06-03 | End: 2021-06-03

## 2021-06-03 RX ORDER — ATORVASTATIN CALCIUM 80 MG/1
80 TABLET, FILM COATED ORAL AT BEDTIME
Refills: 0 | Status: DISCONTINUED | OUTPATIENT
Start: 2021-06-03 | End: 2021-06-06

## 2021-06-03 RX ORDER — LOSARTAN POTASSIUM 100 MG/1
100 TABLET, FILM COATED ORAL DAILY
Refills: 0 | Status: DISCONTINUED | OUTPATIENT
Start: 2021-06-03 | End: 2021-06-06

## 2021-06-03 RX ORDER — ONDANSETRON 8 MG/1
4 TABLET, FILM COATED ORAL EVERY 6 HOURS
Refills: 0 | Status: DISCONTINUED | OUTPATIENT
Start: 2021-06-03 | End: 2021-06-03

## 2021-06-03 RX ORDER — ACETAMINOPHEN 500 MG
650 TABLET ORAL EVERY 6 HOURS
Refills: 0 | Status: DISCONTINUED | OUTPATIENT
Start: 2021-06-03 | End: 2021-06-06

## 2021-06-03 RX ORDER — HYDRALAZINE HCL 50 MG
10 TABLET ORAL
Refills: 0 | Status: DISCONTINUED | OUTPATIENT
Start: 2021-06-03 | End: 2021-06-06

## 2021-06-03 RX ORDER — LABETALOL HCL 100 MG
5 TABLET ORAL ONCE
Refills: 0 | Status: COMPLETED | OUTPATIENT
Start: 2021-06-03 | End: 2021-06-03

## 2021-06-03 RX ORDER — SENNA PLUS 8.6 MG/1
2 TABLET ORAL AT BEDTIME
Refills: 0 | Status: DISCONTINUED | OUTPATIENT
Start: 2021-06-03 | End: 2021-06-06

## 2021-06-03 RX ORDER — LABETALOL HCL 100 MG
10 TABLET ORAL ONCE
Refills: 0 | Status: COMPLETED | OUTPATIENT
Start: 2021-06-03 | End: 2021-06-03

## 2021-06-03 RX ORDER — NICARDIPINE HYDROCHLORIDE 30 MG/1
5 CAPSULE, EXTENDED RELEASE ORAL
Qty: 40 | Refills: 0 | Status: DISCONTINUED | OUTPATIENT
Start: 2021-06-03 | End: 2021-06-03

## 2021-06-03 RX ORDER — LOSARTAN POTASSIUM 100 MG/1
50 TABLET, FILM COATED ORAL DAILY
Refills: 0 | Status: DISCONTINUED | OUTPATIENT
Start: 2021-06-03 | End: 2021-06-03

## 2021-06-03 RX ADMIN — Medication 650 MILLIGRAM(S): at 17:06

## 2021-06-03 RX ADMIN — Medication 650 MILLIGRAM(S): at 18:00

## 2021-06-03 RX ADMIN — ATORVASTATIN CALCIUM 80 MILLIGRAM(S): 80 TABLET, FILM COATED ORAL at 21:27

## 2021-06-03 RX ADMIN — Medication 10 MILLIGRAM(S): at 04:29

## 2021-06-03 RX ADMIN — Medication 10 MILLIGRAM(S): at 05:10

## 2021-06-03 RX ADMIN — SENNA PLUS 2 TABLET(S): 8.6 TABLET ORAL at 21:27

## 2021-06-03 RX ADMIN — Medication 10 MILLIGRAM(S): at 17:06

## 2021-06-03 RX ADMIN — Medication 5 MILLIGRAM(S): at 15:57

## 2021-06-03 RX ADMIN — LOSARTAN POTASSIUM 50 MILLIGRAM(S): 100 TABLET, FILM COATED ORAL at 14:46

## 2021-06-03 RX ADMIN — LEVETIRACETAM 400 MILLIGRAM(S): 250 TABLET, FILM COATED ORAL at 06:10

## 2021-06-03 NOTE — PROGRESS NOTE ADULT - SUBJECTIVE AND OBJECTIVE BOX
Neurology Stroke Progress Note    INTERVAL HPI/OVERNIGHT EVENTS:  No acute events overnight. Patient seen and examined, no complaints. Left arm and leg with improved strength. No neurosurgical intervention per nsgy. Repeat CTH stable with right basal ganglia infarct. Patient weaned off of cardene gtt and currently hydralazine 10mg IVP q2 hours prn.     MEDICATIONS  (STANDING):  atorvastatin 80 milliGRAM(s) Oral at bedtime  bisacodyl 5 milliGRAM(s) Oral at bedtime  senna 2 Tablet(s) Oral at bedtime    MEDICATIONS  (PRN):  acetaminophen   Tablet .. 650 milliGRAM(s) Oral every 6 hours PRN Temp greater or equal to 38C (100.4F), Mild Pain (1 - 3)  hydrALAZINE Injectable 10 milliGRAM(s) IV Push every 2 hours PRN SBP > 140  ondansetron Injectable 4 milliGRAM(s) IV Push every 6 hours PRN Nausea and/or Vomiting      Allergies    No Known Allergies    Intolerances        Vital Signs Last 24 Hrs  T(C): 36.7 (03 Jun 2021 10:34), Max: 37.1 (02 Jun 2021 13:56)  T(F): 98.1 (03 Jun 2021 10:34), Max: 98.7 (02 Jun 2021 13:56)  HR: 58 (03 Jun 2021 12:14) (58 - 108)  BP: 142/74 (03 Jun 2021 12:14) (117/59 - 229/140)  BP(mean): 102 (03 Jun 2021 12:14) (69 - 133)  RR: 20 (03 Jun 2021 12:14) (14 - 37)  SpO2: 94% (03 Jun 2021 12:14) (91% - 99%)    Physical exam:  General: No acute distress, awake and alert  Eyes: Anicteric sclerae, moist conjunctivae, see below for CNs  Neck: trachea midline, FROM, supple, no thyromegaly or lymphadenopathy  Extremities: no edema    Neurologic:  -Mental status: Awake, alert, oriented to person, place, and time. Speech is fluent with intact naming, repetition, and comprehension, mild dysarthria. Recent and remote memory intact. Follows commands. Attention/concentration intact. Fund of knowledge appropriate.  -Cranial nerves:   II: Visual fields are full to confrontation.  III, IV, VI: Extraocular movements are intact without nystagmus. Pupils equally round and reactive to light  V:  Facial sensation V1-V3 equal and intact   VII: Left facial droop  VIII: Hearing is grossly bilaterally intact  Motor: Normal bulk and tone. Left arm with drift, does not hit bed, strength 4/5 proximally, 5/5 distally. Left lower extremity strength 4/5. Right arm and leg strength 5/5.   Sensation: Intact to light touch bilaterally. No neglect or extinction on double simultaneous testing.        LABS:                        14.5   7.91  )-----------( 263      ( 03 Jun 2021 05:38 )             45.1     06-03    138  |  103  |  13  ----------------------------<  110<H>  4.0   |  25  |  0.90    Ca    9.0      03 Jun 2021 05:38  Phos  3.5     06-03  Mg     2.3     06-03    TPro  7.6  /  Alb  4.5  /  TBili  0.4  /  DBili  x   /  AST  30  /  ALT  68<H>  /  AlkPhos  107  06-02    PT/INR - ( 02 Jun 2021 14:15 )   PT: 12.5 sec;   INR: 1.04          PTT - ( 02 Jun 2021 14:15 )  PTT:31.0 sec      RADIOLOGY & ADDITIONAL TESTS:  < from: CT Head No Cont (06.02.21 @ 21:53) >  IMPRESSION: Stable right basal ganglia hemorrhage with mild surrounding edema.         Stroke Neurology Acceptance Note from Neurosurgery     HPI/Hospital Course:  47 yo M w/ PMH of HTN, ETOH use (4-5 beers nightly) who presented as LVO stroke code for slurred speech, left sided weakness. 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 During transport, /117 and upon ED arrival /117, BS 99. AOx3, noted to also have left facial droop, decreased sensation on left side and left arm weakness. Labetalol 20mg IVP given at 1357, cardene gtt started for further BP management. CTH with right basal ganglia hemorrhage and age indeterminant right caudate head infarct. CTA with left V4 narrowing.     6/2: Admitted to Neuro ICU with neurosurgery, started on keppra 500mg BID, on cardene gtt for -140. 6hr CTH stable with no changes.   6/3: Cardene gtt resumed at 6am for BP control, also received hydralazine 10mg IVP at 6am. No neurosurgery intervention. Patient stable for stepdown to stroke neurology.     No acute events overnight. Patient seen and examined, no complaints. Left arm and leg with improved strength. No neurosurgical intervention per nsgy. Repeat CTH stable with right basal ganglia infarct. Patient currently off cardene gtt.     MEDICATIONS  (STANDING):  atorvastatin 80 milliGRAM(s) Oral at bedtime  bisacodyl 5 milliGRAM(s) Oral at bedtime  senna 2 Tablet(s) Oral at bedtime    MEDICATIONS  (PRN):  acetaminophen   Tablet .. 650 milliGRAM(s) Oral every 6 hours PRN Temp greater or equal to 38C (100.4F), Mild Pain (1 - 3)  hydrALAZINE Injectable 10 milliGRAM(s) IV Push every 2 hours PRN SBP > 140  ondansetron Injectable 4 milliGRAM(s) IV Push every 6 hours PRN Nausea and/or Vomiting      Allergies    No Known Allergies    Intolerances        Vital Signs Last 24 Hrs  T(C): 36.7 (03 Jun 2021 10:34), Max: 37.1 (02 Jun 2021 13:56)  T(F): 98.1 (03 Jun 2021 10:34), Max: 98.7 (02 Jun 2021 13:56)  HR: 58 (03 Jun 2021 12:14) (58 - 108)  BP: 142/74 (03 Jun 2021 12:14) (117/59 - 229/140)  BP(mean): 102 (03 Jun 2021 12:14) (69 - 133)  RR: 20 (03 Jun 2021 12:14) (14 - 37)  SpO2: 94% (03 Jun 2021 12:14) (91% - 99%)    Physical exam:  General: No acute distress, awake and alert  Eyes: Anicteric sclerae, moist conjunctivae, see below for CNs  Neck: trachea midline, FROM, supple, no thyromegaly or lymphadenopathy  Extremities: no edema    Neurologic:  -Mental status: Awake, alert, oriented to person, place, and time. Speech is fluent with intact naming, repetition, and comprehension, mild dysarthria. Recent and remote memory intact. Follows commands. Attention/concentration intact. Fund of knowledge appropriate.  -Cranial nerves:   II: Visual fields are full to confrontation.  III, IV, VI: Extraocular movements are intact without nystagmus. Pupils equally round and reactive to light  V:  Facial sensation V1-V3 equal and intact   VII: Left facial droop  VIII: Hearing is grossly bilaterally intact  Motor: Normal bulk and tone. Left arm with drift, does not hit bed, strength 4/5 proximally, 5/5 distally. Left lower extremity strength 4/5. Right arm and leg strength 5/5.   Sensation: Intact to light touch bilaterally. No neglect or extinction on double simultaneous testing.        LABS:                        14.5   7.91  )-----------( 263      ( 03 Jun 2021 05:38 )             45.1     06-03    138  |  103  |  13  ----------------------------<  110<H>  4.0   |  25  |  0.90    Ca    9.0      03 Jun 2021 05:38  Phos  3.5     06-03  Mg     2.3     06-03    TPro  7.6  /  Alb  4.5  /  TBili  0.4  /  DBili  x   /  AST  30  /  ALT  68<H>  /  AlkPhos  107  06-02    PT/INR - ( 02 Jun 2021 14:15 )   PT: 12.5 sec;   INR: 1.04          PTT - ( 02 Jun 2021 14:15 )  PTT:31.0 sec      RADIOLOGY & ADDITIONAL TESTS:  < from: CT Head No Cont (06.02.21 @ 21:53) >  IMPRESSION: Stable right basal ganglia hemorrhage with mild surrounding edema.         Stroke Neurology Acceptance Note from Neurosurgery     HPI/Hospital Course:  45 yo M w/ PMH of HTN, ETOH use (4-5 beers nightly) who presented as LVO stroke code for slurred speech, left sided weakness. 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 During transport, /117 and upon ED arrival /117, BS 99. AOx3, noted to also have left facial droop, decreased sensation on left side and left arm weakness. Labetalol 20mg IVP given at 1357, cardene gtt started for further BP management. CTH with right basal ganglia hemorrhage and age indeterminant right caudate head infarct. CTA with left V4 narrowing.     6/2: Admitted to Neuro ICU with neurosurgery, started on keppra 500mg BID, on cardene gtt for -140. 6hr CTH stable with no changes.   6/3: Cardene gtt resumed at 6am for BP control, also received hydralazine 10mg IVP at 6am. No neurosurgery intervention. Patient stable for stepdown to stroke neurology from neurosurgery & NSICU standpoint.     No acute events overnight. Patient seen and examined, no complaints. Left arm and leg with improved strength. No neurosurgical intervention per nsgy. Repeat CTH stable with right basal ganglia hemorrhage with surrounding edema. Patient currently off cardene gtt.     MEDICATIONS  (STANDING):  atorvastatin 80 milliGRAM(s) Oral at bedtime  bisacodyl 5 milliGRAM(s) Oral at bedtime  senna 2 Tablet(s) Oral at bedtime    MEDICATIONS  (PRN):  acetaminophen   Tablet .. 650 milliGRAM(s) Oral every 6 hours PRN Temp greater or equal to 38C (100.4F), Mild Pain (1 - 3)  hydrALAZINE Injectable 10 milliGRAM(s) IV Push every 2 hours PRN SBP > 140  ondansetron Injectable 4 milliGRAM(s) IV Push every 6 hours PRN Nausea and/or Vomiting      Allergies    No Known Allergies    Intolerances        Vital Signs Last 24 Hrs  T(C): 36.7 (03 Jun 2021 10:34), Max: 37.1 (02 Jun 2021 13:56)  T(F): 98.1 (03 Jun 2021 10:34), Max: 98.7 (02 Jun 2021 13:56)  HR: 58 (03 Jun 2021 12:14) (58 - 108)  BP: 142/74 (03 Jun 2021 12:14) (117/59 - 229/140)  BP(mean): 102 (03 Jun 2021 12:14) (69 - 133)  RR: 20 (03 Jun 2021 12:14) (14 - 37)  SpO2: 94% (03 Jun 2021 12:14) (91% - 99%)    Physical exam:  General: No acute distress, awake and alert  Eyes: Anicteric sclerae, moist conjunctivae, see below for CNs  Neck: trachea midline, FROM, supple, no thyromegaly or lymphadenopathy  Extremities: no edema    Neurologic:  -Mental status: Awake, alert, oriented to person, place, and time. Speech is fluent with intact naming, repetition, and comprehension, mild dysarthria. Recent and remote memory intact. Follows commands. Attention/concentration intact. Fund of knowledge appropriate.  -Cranial nerves:   II: Visual fields are full to confrontation.  III, IV, VI: Extraocular movements are intact without nystagmus. Pupils equally round and reactive to light  V:  Facial sensation V1-V3 equal and intact   VII: Left facial droop  VIII: Hearing is grossly bilaterally intact  Motor: Normal bulk and tone. Left arm with drift, does not hit bed, strength 4/5 proximally, 5/5 distally. Left lower extremity strength 4/5. Right arm and leg strength 5/5.   Sensation: Intact to light touch bilaterally. No neglect or extinction on double simultaneous testing.        LABS:                        14.5   7.91  )-----------( 263      ( 03 Jun 2021 05:38 )             45.1     06-03    138  |  103  |  13  ----------------------------<  110<H>  4.0   |  25  |  0.90    Ca    9.0      03 Jun 2021 05:38  Phos  3.5     06-03  Mg     2.3     06-03    TPro  7.6  /  Alb  4.5  /  TBili  0.4  /  DBili  x   /  AST  30  /  ALT  68<H>  /  AlkPhos  107  06-02    PT/INR - ( 02 Jun 2021 14:15 )   PT: 12.5 sec;   INR: 1.04          PTT - ( 02 Jun 2021 14:15 )  PTT:31.0 sec      RADIOLOGY & ADDITIONAL TESTS:  < from: CT Head No Cont (06.02.21 @ 21:53) >  IMPRESSION: Stable right basal ganglia hemorrhage with mild surrounding edema.         Stroke Neurology Acceptance Note from Neurosurgery     HPI/Hospital Course:  45 yo M w/ PMH of HTN, ETOH use (4-5 beers nightly) who presented as LVO stroke code for slurred speech, left sided weakness. 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 During transport, /117 and upon ED arrival /117, BS 99. AOx3, noted to also have left facial droop, decreased sensation on left side and left arm weakness. Labetalol 20mg IVP given at 1357, cardene gtt started for further BP management. CTH with right basal ganglia hemorrhage and age indeterminant right caudate head infarct. CTA with left V4 narrowing.     6/2: Admitted to Neuro ICU with neurosurgery, started on keppra 500mg BID, on cardene gtt for -140. 6hr CTH stable with no changes.   6/3: Cardene gtt resumed at 6am for BP control, also received hydralazine 10mg IVP at 6am. No neurosurgery intervention. Patient stable for stepdown to stroke neurology from neurosurgery & NSICU standpoint.     No acute events overnight. Patient seen and examined, no complaints. Left arm and leg with improved strength. No neurosurgical intervention per nsgy. Repeat CTH stable with right basal ganglia hemorrhage with surrounding edema. Patient currently off cardene gtt.     MEDICATIONS  (STANDING):  atorvastatin 80 milliGRAM(s) Oral at bedtime  bisacodyl 5 milliGRAM(s) Oral at bedtime  senna 2 Tablet(s) Oral at bedtime    MEDICATIONS  (PRN):  acetaminophen   Tablet .. 650 milliGRAM(s) Oral every 6 hours PRN Temp greater or equal to 38C (100.4F), Mild Pain (1 - 3)  hydrALAZINE Injectable 10 milliGRAM(s) IV Push every 2 hours PRN SBP > 140  ondansetron Injectable 4 milliGRAM(s) IV Push every 6 hours PRN Nausea and/or Vomiting      Allergies    No Known Allergies    Intolerances        Vital Signs Last 24 Hrs  T(C): 36.7 (03 Jun 2021 10:34), Max: 37.1 (02 Jun 2021 13:56)  T(F): 98.1 (03 Jun 2021 10:34), Max: 98.7 (02 Jun 2021 13:56)  HR: 58 (03 Jun 2021 12:14) (58 - 108)  BP: 142/74 (03 Jun 2021 12:14) (117/59 - 229/140)  BP(mean): 102 (03 Jun 2021 12:14) (69 - 133)  RR: 20 (03 Jun 2021 12:14) (14 - 37)  SpO2: 94% (03 Jun 2021 12:14) (91% - 99%)    Physical exam:  General: No acute distress, awake and alert  Eyes: Anicteric sclerae, moist conjunctivae, see below for CNs    Neck: trachea midline, FROM, supple, no thyromegaly or lymphadenopathy  Extremities: no edema    Neurologic:  -Mental status: Awake, alert, oriented to person, place, and time. Speech is fluent with intact naming, repetition, and comprehension, mild dysarthria. Recent and remote memory intact. Follows commands. Attention/concentration intact. Fund of knowledge appropriate.  -Cranial nerves:   II: Visual fields are full to confrontation.  III, IV, VI: Extraocular movements are intact without nystagmus. Pupils equally round and reactive to light  V:  Facial sensation V1-V3 equal and intact   VII: Left facial droop  VIII: Hearing is grossly bilaterally intact  Motor: Normal bulk and tone. Left arm with drift, does not hit bed, strength 4/5 proximally, 5/5 distally. Left lower extremity strength 4/5. Right arm and leg strength 5/5.   Sensation: Intact to light touch bilaterally. No neglect or extinction on double simultaneous testing.        LABS:                        14.5   7.91  )-----------( 263      ( 03 Jun 2021 05:38 )             45.1     06-03    138  |  103  |  13  ----------------------------<  110<H>  4.0   |  25  |  0.90    Ca    9.0      03 Jun 2021 05:38  Phos  3.5     06-03  Mg     2.3     06-03    TPro  7.6  /  Alb  4.5  /  TBili  0.4  /  DBili  x   /  AST  30  /  ALT  68<H>  /  AlkPhos  107  06-02    PT/INR - ( 02 Jun 2021 14:15 )   PT: 12.5 sec;   INR: 1.04          PTT - ( 02 Jun 2021 14:15 )  PTT:31.0 sec      RADIOLOGY & ADDITIONAL TESTS:  < from: CT Head No Cont (06.02.21 @ 21:53) >  IMPRESSION: Stable right basal ganglia hemorrhage with mild surrounding edema.         Stroke Neurology Acceptance Note from Neurosurgery     HPI/Hospital Course:  47 yo M w/ PMH of HTN, ETOH use (4-5 beers nightly) who presented as LVO stroke code for slurred speech, left sided weakness. 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 During transport, /117 and upon ED arrival /117, BS 99. AOx3, noted to also have left facial droop, decreased sensation on left side and left arm weakness. Labetalol 20mg IVP given at 1357, cardene gtt started for further BP management. CTH with right basal ganglia hemorrhage and age indeterminant right caudate head infarct. CTA with left V4 narrowing.     6/2: Admitted to Neuro ICU with neurosurgery, started on keppra 500mg BID, on cardene gtt for -140. 6hr CTH stable with no changes.   6/3: Cardene gtt resumed at 6am for BP control, also received hydralazine 10mg IVP at 6am. No neurosurgery intervention. Patient stable for stepdown to stroke neurology from neurosurgery & NSICU standpoint.     No acute events overnight. Patient seen and examined, no complaints. Left arm and leg with improved strength. No neurosurgical intervention per nsgy. Repeat CTH stable with right basal ganglia hemorrhage with surrounding edema. Patient currently off cardene gtt.     MEDICATIONS  (STANDING):  atorvastatin 80 milliGRAM(s) Oral at bedtime  bisacodyl 5 milliGRAM(s) Oral at bedtime  senna 2 Tablet(s) Oral at bedtime    MEDICATIONS  (PRN):  acetaminophen   Tablet .. 650 milliGRAM(s) Oral every 6 hours PRN Temp greater or equal to 38C (100.4F), Mild Pain (1 - 3)  hydrALAZINE Injectable 10 milliGRAM(s) IV Push every 2 hours PRN SBP > 140  ondansetron Injectable 4 milliGRAM(s) IV Push every 6 hours PRN Nausea and/or Vomiting      Allergies    No Known Allergies    Intolerances        Vital Signs Last 24 Hrs  T(C): 36.7 (03 Jun 2021 10:34), Max: 37.1 (02 Jun 2021 13:56)  T(F): 98.1 (03 Jun 2021 10:34), Max: 98.7 (02 Jun 2021 13:56)  HR: 58 (03 Jun 2021 12:14) (58 - 108)  BP: 142/74 (03 Jun 2021 12:14) (117/59 - 229/140)  BP(mean): 102 (03 Jun 2021 12:14) (69 - 133)  RR: 20 (03 Jun 2021 12:14) (14 - 37)  SpO2: 94% (03 Jun 2021 12:14) (91% - 99%)    Physical exam:  General: No acute distress, awake and alert  Eyes: Anicteric sclerae, moist conjunctivae, see below for CNs  Cardiovascular: Regular rate and rhythm, no murmurs, rubs, or gallops. No carotid bruits.   Pulmonary: Anterior breath sounds clear bilaterally, no crackles or wheezing. No use of accessory muscles  Neck: trachea midline, FROM, supple, no thyromegaly or lymphadenopathy  Extremities: no edema    Neurologic:  -Mental status: Awake, alert, oriented to person, place, and time. Speech is fluent with intact naming, repetition, and comprehension, mild dysarthria. Recent and remote memory intact. Follows commands. Attention/concentration intact. Fund of knowledge appropriate.  -Cranial nerves:   II: Visual fields are full to confrontation.  III, IV, VI: Extraocular movements are intact without nystagmus. Pupils equally round and reactive to light  V:  Facial sensation V1-V3 equal and intact   VII: Left facial droop  VIII: Hearing is grossly bilaterally intact  Motor: Normal bulk and tone. Left arm with drift, does not hit bed, strength 4/5 proximally, 5/5 distally. Left lower extremity strength 4/5. Right arm and leg strength 5/5.   Sensation: Intact to light touch bilaterally. No neglect or extinction on double simultaneous testing.        LABS:                        14.5   7.91  )-----------( 263      ( 03 Jun 2021 05:38 )             45.1     06-03    138  |  103  |  13  ----------------------------<  110<H>  4.0   |  25  |  0.90    Ca    9.0      03 Jun 2021 05:38  Phos  3.5     06-03  Mg     2.3     06-03    TPro  7.6  /  Alb  4.5  /  TBili  0.4  /  DBili  x   /  AST  30  /  ALT  68<H>  /  AlkPhos  107  06-02    PT/INR - ( 02 Jun 2021 14:15 )   PT: 12.5 sec;   INR: 1.04          PTT - ( 02 Jun 2021 14:15 )  PTT:31.0 sec      RADIOLOGY & ADDITIONAL TESTS:  < from: CT Head No Cont (06.02.21 @ 21:53) >  IMPRESSION: Stable right basal ganglia hemorrhage with mild surrounding edema.

## 2021-06-03 NOTE — OCCUPATIONAL THERAPY INITIAL EVALUATION ADULT - MD ORDER
MRS 3. Pt received semisupine, NAD, +telemetry, heplock, spouse at bedside. Pt presents s/p hematoma of the right basal ganglia with left upper extremity hemiparesis, resulting in decreased ADLs.

## 2021-06-03 NOTE — CONSULT NOTE ADULT - ASSESSMENT
per Neurology    45 yo M w/ PMH of HTN (on unknown medication), who presented to ED BIBEMS prenotification for LVO stroke code for slurred speech, left sided weakness, and pt noted not to be at baseline by pt's co workers, symptoms started at 1325. Pt to be super hypertensive to the 220s/110s and was started on cardene drip. Neuro sx consulted and evaluated pt. Pt will be admitted under neurosurgery. HCT showed 3.3 cm hematoma centered in the R basal ganglia and age indeterminate R caudate head infarct. CTA negative.   recommend:    Right putamen hemorrhage from uncontrolled HTN, asymptomatic old right caudate lacune, b/l V4 mild stenosis/athero    1) Management per primary neurosurgery team  - continue cardene drip to maintain SBP goal <140  - recommend q1 hr stroke neuro checks  - check TTE     2)Secondary stroke prevention  - start Atorvastatin 80mg PO daily  -no anticoagulation/antiplatelets/DVT chemophrophylaxis at this time due to hemorrhage (when cleared from neurosurgical standpoint-possibly in 1 week-patient needs to be started on aspirin 81 for prior ischemic stroke & b/l vertebral artery mild stenosis)    3) Stroke risk factors  - A1C: obtain   - LDL: 161  - HTN  - provide stroke education  - will need outpt neurology follow up  - OP vascular risk factor mgt with PCP (goal BP<120/80 with ACEI+/-CCB’s, LDL bet 50 & 70, A1C<7.0, avoid EtOH)

## 2021-06-03 NOTE — OCCUPATIONAL THERAPY INITIAL EVALUATION ADULT - MODALITIES TREATMENT COMMENTS
Cranial Nerves II - XII: II: PERRLA; visual fields are full to confrontation; III, IV, VI: EOMI, no nystagmus appreciated; V: facial sensation intact to light touch V1-V3 b/l; VII: no ptosis, L facial droop, symmetric eyebrow raise and closure; VIII: hearing intact to finger rub b/l; XI: head turning and shoulder shrug mildly asymmetric on the L; XII: tongue protrusion mildly to the L

## 2021-06-03 NOTE — PHYSICAL THERAPY INITIAL EVALUATION ADULT - PERTINENT HX OF CURRENT PROBLEM, REHAB EVAL
45 y/o M with PMhx of HTN, takes one medication which he does not remember the name of, no anticoagulation including no aspirin, but notes compliance with his medication. Patient also mentions the use of 4-5 beers per night. 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

## 2021-06-03 NOTE — PROGRESS NOTE ADULT - SUBJECTIVE AND OBJECTIVE BOX
HPI:  45 y/o M with PMhx of HTN, takes one medication which he does not remember the name of, no anticoagulation including no aspirin, but notes compliance with his medication. Patient also mentions the use of 4-5 beers per night. 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 EMS called in stroke notification and code stroke was called prior to arrival, taken immediately to CT scan. As per patient denies headache, blurry vison. Notes more trouble moving LUE than moving LLE, no neck pain, fever, infectious symptoms. Denies drug use. (02 Jun 2021 15:37)        Hospital Course:   6/2: Admitted with hypertensive right basal ganglia IPH  6/3: Repeat CTH performed overnight- stable bleed. SBP goal 100-140. Cardene resumed at 6am for SBP control.      Vital Signs Last 24 Hrs  T(C): 36.4 (03 Jun 2021 01:03), Max: 37.1 (02 Jun 2021 13:56)  T(F): 97.6 (03 Jun 2021 01:03), Max: 98.7 (02 Jun 2021 13:56)  HR: 68 (03 Jun 2021 05:55) (59 - 108)  BP: 174/73 (03 Jun 2021 05:55) (117/59 - 229/140)  BP(mean): 105 (03 Jun 2021 05:55) (69 - 133)  RR: 17 (03 Jun 2021 05:55) (14 - 31)  SpO2: 95% (03 Jun 2021 05:55) (91% - 99%)    I&O's Detail    02 Jun 2021 07:01  -  03 Jun 2021 06:08  --------------------------------------------------------  IN:    IV PiggyBack: 100 mL    NiCARdipine: 50 mL    sodium chloride 0.9%: 675 mL  Total IN: 825 mL    OUT:    Voided (mL): 1200 mL  Total OUT: 1200 mL    Total NET: -375 mL        I&O's Summary    02 Jun 2021 07:01  -  03 Jun 2021 06:08  --------------------------------------------------------  IN: 825 mL / OUT: 1200 mL / NET: -375 mL        PHYSICAL EXAM:  Awake and alert, oriented x3, NAD, dysarthric speech, following commands  PERRL, EOMI, left facial  MAEx4 strength 5/5 UE and LE b/l, +LUE pronator drift  Mild decreased sensation in left face and left arm       TUBES/LINES:  [] CVC  [] A-line  [] Lumbar Drain  [] Ventriculostomy  [] NICK  [] Subramanian  [] NGT   [] Other    DIET:  [] NPO  [x] Mechanical  [] Tube feeds    LABS:                        14.5   7.91  )-----------( 263      ( 03 Jun 2021 05:38 )             45.1     06-03    138  |  103  |  13  ----------------------------<  x   4.0   |  25  |  0.90    Ca    9.0      03 Jun 2021 05:38  Phos  3.5     06-03  Mg     2.3     06-03    TPro  7.6  /  Alb  4.5  /  TBili  0.4  /  DBili  x   /  AST  30  /  ALT  68<H>  /  AlkPhos  107  06-02    PT/INR - ( 02 Jun 2021 14:15 )   PT: 12.5 sec;   INR: 1.04          PTT - ( 02 Jun 2021 14:15 )  PTT:31.0 sec        CAPILLARY BLOOD GLUCOSE  97 (02 Jun 2021 14:48)      POCT Blood Glucose.: 97 mg/dL (02 Jun 2021 13:53)      Drug Levels: [] N/A    CSF Analysis: [] N/A      Allergies    No Known Allergies    Intolerances        Home Medications:      MEDICATIONS:  Antibiotics:    Neuro:  acetaminophen   Tablet .. 650 milliGRAM(s) Oral every 6 hours PRN  levETIRAcetam  IVPB 500 milliGRAM(s) IV Intermittent every 12 hours  ondansetron Injectable 4 milliGRAM(s) IV Push every 6 hours PRN    Anticoagulation:    OTHER:  atorvastatin 80 milliGRAM(s) Oral at bedtime  bisacodyl 5 milliGRAM(s) Oral at bedtime  hydrALAZINE Injectable 10 milliGRAM(s) IV Push every 2 hours PRN  niCARdipine Infusion 5 mG/Hr IV Continuous <Continuous>  senna 2 Tablet(s) Oral at bedtime    IVF:  sodium chloride 0.9%. 1000 milliLiter(s) IV Continuous <Continuous>    CULTURES:    RADIOLOGY & ADDITIONAL TESTS:      ASSESSMENT:  46M hx of HTN who came through the ED BIBEMS for left upper extremity weakness and left facial droop. CTH showing a 3.3cm hematoma of the right basal ganglia. Admitted to NSICU for close neuromonitoring.   NIHSS 5, ICH score 0.      INTRACRANIAL BLEED    Handoff    MEWS Score    No pertinent past medical history    HTN (hypertension)    Intracranial bleed    No significant past surgical history    STROKE SYMPTOMS    [email or web address]        PLAN:  NEURO:  -neuro/vital checks  -pain control prn  -keppra 500mg bid  -stability CTH- stable    CARDIOVASCULAR:  -SBP goal 100-140  -cardene gtt prn  -antihypertensives prn    PULMONARY:  -room air, satting well     RENAL:  -IVF  -voiding well     GI:  -advance diet as tolerated  -bowel regimen    HEME:  -trend h/h    ID:  -afebrile  -trend WBC    ENDO:  -trend blood glucose     DVT PROPHYLAXIS:  [x] Venodynes                                [] Heparin/Lovenox    FALL RISK:  [] Low Risk                                    [] Impulsive    DISPOSITION:   -ICU status   -Full code  -Family updated   -PT/OT pending   -Discussed with Dr. Ledesma and Dr. Taylor       Assessment:  Present when checked    []  GCS  E   V  M     Heart Failure: []Acute, [] acute on chronic , []chronic  Heart Failure:  [] Diastolic (HFpEF), [] Systolic (HFrEF), []Combined (HFpEF and HFrEF), [] RHF, [] Pulm HTN, [] Other    [] MARYELLEN, [] ATN, [] AIN, [] other  [] CKD1, [] CKD2, [] CKD 3, [] CKD 4, [] CKD 5, []ESRD    Encephalopathy: [] Metabolic, [] Hepatic, [] toxic, [] Neurological, [] Other    Abnormal Nurtitional Status: [] malnurtition (see nutrition note), [ ]underweight: BMI < 19, [] morbid obesity: BMI >40, [] Cachexia    [] Sepsis  [] hypovolemic shock,[] cardiogenic shock, [] hemorrhagic shock, [] neuogenic shock  [] Acute Respiratory Failure  []Cerebral edema, [] Brain compression/ herniation,   [] Functional quadriplegia  [] Acute blood loss anemia

## 2021-06-03 NOTE — PROGRESS NOTE ADULT - SUBJECTIVE AND OBJECTIVE BOX
NEUROCRITICAL CARE PROGRESS NOTE    ISH GRAYSON   MRN-[id number]  Summary:  /  HPI:  47 y/o M with PMhx of HTN, takes one medication which he does not remember the name of, no anticoagulation including no aspirin, but notes compliance with his medication. Patient also mentions the use of 4-5 beers per night. 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 EMS called in stroke notification and code stroke was called prior to arrival, taken immediately to CT scan. As per patient denies headache, blurry vison. Notes more trouble moving LUE than moving LLE, no neck pain, fever, infectious symptoms. Denies drug use. (02 Jun 2021 15:37)      Overnight Events:       PHYSICAL EXAMINATION  T(C): 36.4 (06-03 @ 06:03), Max: 37.1 (06-02 @ 13:56)  HR: 80 (06-03 @ 07:00) (59 - 108)  BP: 130/67 (06-03 @ 07:00) (117/59 - 229/140)  BP(mean): 91 (06-03 @ 07:00)  ABP: --  ABP(mean): --  RR: 23 (06-03 @ 07:00) (14 - 31)  SpO2: 96% (06-03 @ 07:00) (91% - 99%)  CVP(mm Hg): --      06-02-21 @ 07:01  -  06-03-21 @ 07:00  --------------------------------------------------------  IN:    IV PiggyBack: 200 mL    NiCARdipine: 50 mL    Oral Fluid: 720 mL    sodium chloride 0.9%: 900 mL  Total IN: 1870 mL    OUT:    Voided (mL): 1600 mL  Total OUT: 1600 mL    Total NET: 270 mL                  LABS:  CAPILLARY BLOOD GLUCOSE  97 (02 Jun 2021 14:48)      POCT Blood Glucose.: 97 mg/dL (02 Jun 2021 13:53)                          14.5   7.91  )-----------( 263      ( 03 Jun 2021 05:38 )             45.1     06-03    138  |  103  |  13  ----------------------------<  110<H>  4.0   |  25  |  0.90    Ca    9.0      03 Jun 2021 05:38  Phos  3.5     06-03  Mg     2.3     06-03    TPro  7.6  /  Alb  4.5  /  TBili  0.4  /  DBili  x   /  AST  30  /  ALT  68<H>  /  AlkPhos  107  06-02          MEDICATIONS:  MEDICATIONS  (STANDING):  atorvastatin 80 milliGRAM(s) Oral at bedtime  bisacodyl 5 milliGRAM(s) Oral at bedtime  levETIRAcetam  IVPB 500 milliGRAM(s) IV Intermittent every 12 hours  niCARdipine Infusion 5 mG/Hr (25 mL/Hr) IV Continuous <Continuous>  senna 2 Tablet(s) Oral at bedtime  sodium chloride 0.9%. 1000 milliLiter(s) (75 mL/Hr) IV Continuous <Continuous>    MEDICATIONS  (PRN):  acetaminophen   Tablet .. 650 milliGRAM(s) Oral every 6 hours PRN Temp greater or equal to 38C (100.4F), Mild Pain (1 - 3)  hydrALAZINE Injectable 10 milliGRAM(s) IV Push every 2 hours PRN SBP > 140  ondansetron Injectable 4 milliGRAM(s) IV Push every 6 hours PRN Nausea and/or Vomiting        NIHSS 3

## 2021-06-03 NOTE — OCCUPATIONAL THERAPY INITIAL EVALUATION ADULT - MANUAL MUSCLE TESTING RESULTS, REHAB EVAL
BUE 5/5; RUE 5/5; L shoulder 3-/5, elbow 4/5, forearm sup 3-/5, forearm pro 4/5, wrist extension 3-/5, wrist flexion 4/5, digit extension/flexion 4/5

## 2021-06-03 NOTE — PROGRESS NOTE ADULT - ASSESSMENT
47 yo M w/ PMH of HTN (on unknown medication), who presented for LVO stroke code for slurred speech, left sided weakness, with symptom onset 6/2/2021 1325. Pt hypertensive to the 220s/110s and was started on cardene drip. HCT showed 3.3 cm hematoma centered in the R basal ganglia and age indeterminate R caudate head infarct. CTA negative. No neurological intervention per neurosurgery. Pt currently weaned of cardene gtt.     Neuro  #CVA workup  - hold aspirin for now as pt with hemorrhage, pending clearance from nsgy (likely in 1 week).    - continue atorvastatin 80mg daily  - q4hr stroke neuro checks and vitals  - Stroke Code HCT Results: right basal ganglia and age indeterminate right caudate infarct  - Stroke Code CTA Results: narrowing of left V4  - Stroke education    Cards  #HTN  - strict BP control, Goal -140  - start home BP med   - obtain TTE with bubble  - Stroke Code EKG Results:     #HLD  - high dose statin as above in CVA  - LDL results: 161    Pulm  - call provider if SPO2 < 94%    GI  #Nutrition/Fluids/Electrolytes   - replete K<4 and Mg <2  - Diet: Regular  - IVF: none    Infectious Disease  - Stroke Code CXR results: Cardiomegaly/cardiac magnification.     Endocrine  #DM  - A1C results: 6.1    - TSH results: 1.570    DVT Prophylaxis  - SCDs for DVT prophylaxis       IDR Goals: Goals reviewed at interdisciplinary rounds with case management, social work, physical therapy, occupational therapy, and speech language pathology.   Please see specific therapy  notes for in depth goals.  Dispo: pending PT/OT eval     Discussed daily hospital plans and goals with patient and family at bedside.      47 yo M w/ PMH of HTN (on unknown medication), who presented for LVO stroke code for slurred speech, left sided weakness, with symptom onset 6/2/2021 1325. Pt hypertensive to the 220s/110s and was started on cardene drip. HCT showed 3.3 cm hematoma centered in the R basal ganglia and age indeterminate R caudate head infarct. CTA negative. No neurological intervention per neurosurgery. Pt currently weaned of cardene gtt.     Neuro  #CVA workup  - hold aspirin for now as pt with hemorrhage, pending clearance from nsgy (likely in 1 week).    - continue atorvastatin 80mg daily  - q4hr stroke neuro checks and vitals  - Stroke Code HCT Results: right basal ganglia and age indeterminate right caudate infarct  - Stroke Code CTA Results: narrowing of left V4  - Stroke education    Cards  #HTN  - strict BP control, Goal -140  - start home BP med (needs med rec)  - obtain TTE with bubble  - Stroke Code EKG Results:     #HLD  - high dose statin as above in CVA  - LDL results: 161    Pulm  - call provider if SPO2 < 94%    GI  #Nutrition/Fluids/Electrolytes   - replete K<4 and Mg <2  - Diet: Regular  - IVF: none    Infectious Disease  - Stroke Code CXR results: Cardiomegaly/cardiac magnification.     Endocrine  #DM  - A1C results: 6.1    - TSH results: 1.570    DVT Prophylaxis  - SCDs for DVT prophylaxis       IDR Goals: Goals reviewed at interdisciplinary rounds with case management, social work, physical therapy, occupational therapy, and speech language pathology.   Please see specific therapy  notes for in depth goals.  Dispo: pending PT/OT eval     Discussed daily hospital plans and goals with patient and family at bedside.      47 yo M w/ PMH of HTN (on unknown medication), who presented for LVO stroke code for slurred speech, left sided weakness, with symptom onset 6/2/2021 1325. Pt hypertensive to the 220s/110s and was started on cardene drip. HCT showed 3.3 cm hematoma centered in the R basal ganglia and age indeterminate R caudate head infarct. CTA negative. No neurological intervention per neurosurgery. Pt currently weaned of cardene gtt.     Neuro  #CVA workup  - hold aspirin for now as pt with hemorrhage, pending clearance from nsgy (likely in 1 week).    - nsgy recommends MRI brain with and without contrast prior to discharge. Will clarify with nsgy if needed   - continue atorvastatin 80mg daily  - q4hr stroke neuro checks and vitals  - Stroke Code HCT Results: right basal ganglia and age indeterminate right caudate infarct  - Stroke Code CTA Results: narrowing of left V4  - Stroke education    Cards  #HTN  - strict BP control, Goal -140  - start home BP med (needs med rec)  - obtain TTE with bubble  - Stroke Code EKG Results:     #HLD  - high dose statin as above in CVA  - LDL results: 161    Pulm  - call provider if SPO2 < 94%    GI  #Nutrition/Fluids/Electrolytes   - replete K<4 and Mg <2  - Diet: Regular  - IVF: none    Infectious Disease  - Stroke Code CXR results: Cardiomegaly/cardiac magnification.     Endocrine  #DM  - A1C results: 6.1    - TSH results: 1.570    DVT Prophylaxis  - SCDs for DVT prophylaxis       IDR Goals: Goals reviewed at interdisciplinary rounds with case management, social work, physical therapy, occupational therapy, and speech language pathology.   Please see specific therapy  notes for in depth goals.  Dispo: pending PT/OT eval     Discussed daily hospital plans and goals with patient and family at bedside.      45 yo M w/ PMH of HTN (on unknown medication), who presented for LVO stroke code for slurred speech, left sided weakness, with symptom onset 6/2/2021 1325. Pt hypertensive to the 220s/110s and was started on cardene drip. HCT showed 3.3 cm hematoma centered in the R basal ganglia and age indeterminate R caudate head infarct. CTA negative. No neurological intervention per neurosurgery. Pt currently weaned of cardene gtt.     Neuro  #CVA workup  - hold aspirin for now as pt with hemorrhage, pending clearance from nsgy (likely in 1 week).    - nsgy recommends MRI brain with and without contrast prior to discharge. Will clarify with nsgy if needed   - continue atorvastatin 80mg daily  - q4hr stroke neuro checks and vitals  - Stroke Code HCT Results: right basal ganglia and age indeterminate right caudate infarct  - Stroke Code CTA Results: narrowing of left V4  - Stroke education    Cards  #HTN  - strict BP control, Goal -140  - start losartan 50mg daily and titrate to home dose of 100mg as needed  - obtain TTE with bubble  - Stroke Code EKG Results:     #HLD  - high dose statin as above in CVA  - LDL results: 161    Pulm  - call provider if SPO2 < 94%    GI  #Nutrition/Fluids/Electrolytes   - replete K<4 and Mg <2  - Diet: Regular  - IVF: none    Infectious Disease  - Stroke Code CXR results: Cardiomegaly/cardiac magnification.     Endocrine  #DM  - A1C results: 6.1    - TSH results: 1.570    DVT Prophylaxis  - SCDs for DVT prophylaxis       IDR Goals: Goals reviewed at interdisciplinary rounds with case management, social work, physical therapy, occupational therapy, and speech language pathology.   Please see specific therapy  notes for in depth goals.  Dispo: pending PT/OT eval     Discussed daily hospital plans and goals with patient and family at bedside.      45 yo M w/ PMH of HTN (on unknown medication), who presented for LVO stroke code for slurred speech, left sided weakness, with symptom onset 6/2/2021 1325. Pt hypertensive to the 220s/110s and was started on cardene drip. HCT showed 3.3 cm hematoma centered in the R basal ganglia and age indeterminate R caudate head infarct. CTA left V4. No neurological intervention per neurosurgery. Pt currently weaned of cardene gtt.     Neuro  #CVA workup  - hold aspirin for now as pt with hemorrhage, pending clearance from nsgy (likely in 1 week).    - nsgy recommends MRI brain with and without contrast   - continue atorvastatin 80mg daily  - q4hr stroke neuro checks and vitals  - Stroke Code HCT Results: right basal ganglia and age indeterminate right caudate infarct  - Stroke Code CTA Results: narrowing of left V4  - Stroke education    Cards  #HTN  - strict BP control, Goal -140  - start losartan 50mg daily and titrate to home dose of 100mg as needed  - obtain TTE with bubble  - Stroke Code EKG Results:     #HLD  - high dose statin as above in CVA  - LDL results: 161    Pulm  - call provider if SPO2 < 94%    GI  #Nutrition/Fluids/Electrolytes   - replete K<4 and Mg <2  - Diet: Regular  - IVF: none    Infectious Disease  - Stroke Code CXR results: Cardiomegaly/cardiac magnification.     Endocrine  #DM  - A1C results: 6.1    - TSH results: 1.570    DVT Prophylaxis  - SCDs for DVT prophylaxis       Dispo: pending PT/OT eval     Discussed daily hospital plans and goals with patient and family at bedside.      45 yo M w/ PMH of HTN (on unknown medication), who presented for LVO stroke code for slurred speech, left sided weakness, with symptom onset 6/2/2021 1325. Pt hypertensive to the 220s/110s and was started on cardene drip. HCT showed 3.3 cm hematoma centered in the R basal ganglia and age indeterminate R caudate head infarct. CTA left V4. No neurological intervention per neurosurgery. Pt currently weaned of cardene gtt.     Neuro  #Right putamen hemorrhage form uncontrolled HTN (at risk of edema & worsening neurologic function), asymptomatic old right caudate lacune, b/l V4 mild stenosis/athero  - hold aspirin for now as pt with hemorrhage, pending clearance from nsgy (likely in 1 week).    - nsgy recommends MRI brain with and without contrast   - continue atorvastatin 80mg daily  - q4hr stroke neuro checks and vitals  - Stroke education    Cards  #HTN  - strict BP control, Goal -140  - start losartan 50mg daily and titrate to home dose of 100mg as needed  - obtain TTE with bubble  - Stroke Code EKG Results:     #HLD  - high dose statin as above in CVA  - LDL results: 161    Pulm  - call provider if SPO2 < 94%    GI  #Nutrition/Fluids/Electrolytes   - replete K<4 and Mg <2  - Diet: Regular  - IVF: none    Infectious Disease  - Stroke Code CXR results: Cardiomegaly/cardiac magnification.     Endocrine  #DM  - A1C results: 6.1    - TSH results: 1.570    DVT Prophylaxis  - SCDs for DVT prophylaxis       Dispo: pending PT/OT eval     Discussed daily hospital plans and goals with patient and wife at bedside in detail.      45 yo M w/ PMH of HTN (on unknown medication), who presented for LVO stroke code for slurred speech, left sided weakness, with symptom onset 6/2/2021 1325. Pt hypertensive to the 220s/110s and was started on cardene drip. HCT showed 3.3 cm hematoma centered in the R basal ganglia and age indeterminate R caudate head infarct. CTA left V4. No neurological intervention per neurosurgery. Pt currently weaned of cardene gtt.     Neuro  #Right putamen hemorrhage form uncontrolled HTN (at risk of edema & worsening neurologic function), asymptomatic old right caudate lacune, b/l V4 mild stenosis/athero  - hold aspirin for now as pt with hemorrhage, pending clearance from nsgy (likely in 1 week).    - nsgy recommends MRI brain with and without contrast   - continue atorvastatin 80mg daily  - q4hr stroke neuro checks and vitals  - Stroke education    Cards  #HTN  - strict BP control, Goal -140  - start losartan 50mg daily and titrate to home dose of 100mg as needed  - obtain TTE with bubble  - Stroke Code EKG Results: NSR with PACs     #HLD  - high dose statin as above in CVA  - LDL results: 161    Pulm  - call provider if SPO2 < 94%    GI  #Nutrition/Fluids/Electrolytes   - replete K<4 and Mg <2  - Diet: Regular  - IVF: none    Infectious Disease  - Stroke Code CXR results: Cardiomegaly/cardiac magnification.     Endocrine  #DM  - A1C results: 6.1    - TSH results: 1.570    DVT Prophylaxis  - SCDs for DVT prophylaxis       Dispo: pending PT/OT eval     Discussed daily hospital plans and goals with patient and wife at bedside in detail.

## 2021-06-03 NOTE — PROGRESS NOTE ADULT - SUBJECTIVE AND OBJECTIVE BOX
TRANSFER to STROKE/NEURO NOTE:     HPI:  47 y/o M with PMhx of HTN, takes one medication which he does not remember the name of, no anticoagulation including no aspirin, but notes compliance with his medication. Patient also mentions the use of 4-5 beers per night. 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 EMS called in stroke notification and code stroke was called prior to arrival, taken immediately to CT scan. As per patient denies headache, blurry vison. Notes more trouble moving LUE than moving LLE, no neck pain, fever, infectious symptoms. Denies drug use. (02 Jun 2021 15:37)    Vital Signs Last 24 Hrs  T(C): 36.6 (03 Jun 2021 13:28), Max: 36.7 (03 Jun 2021 10:34)  T(F): 97.8 (03 Jun 2021 13:28), Max: 98.1 (03 Jun 2021 10:34)  HR: 70 (03 Jun 2021 14:37) (58 - 94)  BP: 134/65 (03 Jun 2021 14:37) (117/59 - 186/91)  BP(mean): 92 (03 Jun 2021 14:37) (69 - 133)  RR: 18 (03 Jun 2021 14:37) (14 - 37)  SpO2: 99% (03 Jun 2021 14:37) (91% - 99%)    I&O's Summary    02 Jun 2021 07:01  -  03 Jun 2021 07:00  --------------------------------------------------------  IN: 1945 mL / OUT: 2300 mL / NET: -355 mL    03 Jun 2021 07:01  -  03 Jun 2021 14:55  --------------------------------------------------------  IN: 150 mL / OUT: 300 mL / NET: -150 mL        PHYSICAL EXAM:  GEN: laying in bed, appears well, NAD  NEURO: AOx3. FC, OE spont, dysarthria, +L facial. CNII-XII intact. PERRL, EOMI. +LUE pronator drift. MAEx4. 5/5 strength throughout. SILT  CV: RRR +S1/S2  PULM: CTAB  GI: Abd soft, NT/ND  EXT: ext warm, dry, nontender    TUBES/LINES:  [] Subramanian  [] Lumbar Drain  [] Wound Drains  [] Others      DIET:  [] NPO  [x] Mechanical  [] Tube feeds    LABS:                        14.5   7.91  )-----------( 263      ( 03 Jun 2021 05:38 )             45.1     06-03    138  |  103  |  13  ----------------------------<  110<H>  4.0   |  25  |  0.90    Ca    9.0      03 Jun 2021 05:38  Phos  3.5     06-03  Mg     2.3     06-03    TPro  7.6  /  Alb  4.5  /  TBili  0.4  /  DBili  x   /  AST  30  /  ALT  68<H>  /  AlkPhos  107  06-02    PT/INR - ( 02 Jun 2021 14:15 )   PT: 12.5 sec;   INR: 1.04          PTT - ( 02 Jun 2021 14:15 )  PTT:31.0 sec        CAPILLARY BLOOD GLUCOSE          Drug Levels: [] N/A    CSF Analysis: [] N/A      Allergies    No Known Allergies    Intolerances      MEDICATIONS:  Antibiotics:    Neuro:    Anticoagulation:    OTHER:  atorvastatin 80 milliGRAM(s) Oral at bedtime  bisacodyl 5 milliGRAM(s) Oral at bedtime  hydrALAZINE Injectable 10 milliGRAM(s) IV Push every 2 hours PRN  losartan 50 milliGRAM(s) Oral daily  senna 2 Tablet(s) Oral at bedtime    IVF:    CULTURES:    RADIOLOGY & ADDITIONAL TESTS:      ASSESSMENT:  46M hx of HTN who came through the ED BIBEMS for left upper extremity weakness and left facial droop. CTH showing a 3.3cm hematoma of the right basal ganglia. Admitted to NSICU for close neuromonitoring.   NIHSS 5, ICH score 0.      PLAN:  - transfer to stroke/neurology under Dr Hayes   - stability CTH stable   - please obtain MRI brain w/wo contrast  - cont keppra 500 bid   - SBP <140   - continue care as per primary team     Discussed with Dr. Ledesma and Dr Taylor

## 2021-06-03 NOTE — CONSULT NOTE ADULT - SUBJECTIVE AND OBJECTIVE BOX
Patient is a 46y old  Male who presents with a chief complaint of 3.3 cm hematoma centered in the right basal ganglia. Infarction right caudate head. (03 Jun 2021 07:40)       HPI:  45 y/o M with PMhx of HTN, takes one medication which he does not remember the name of, no anticoagulation including no aspirin, but notes compliance with his medication. Patient also mentions the use of 4-5 beers per night. 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 EMS called in stroke notification and code stroke was called prior to arrival, taken immediately to CT scan. As per patient denies headache, blurry vison. Notes more trouble moving LUE than moving LLE, no neck pain, fever, infectious symptoms. Denies drug use. (02 Jun 2021 15:37)      PAST MEDICAL & SURGICAL HISTORY:      MEDICATIONS  (STANDING):  atorvastatin 80 milliGRAM(s) Oral at bedtime  bisacodyl 5 milliGRAM(s) Oral at bedtime  levETIRAcetam  IVPB 500 milliGRAM(s) IV Intermittent every 12 hours  niCARdipine Infusion 5 mG/Hr (25 mL/Hr) IV Continuous <Continuous>  senna 2 Tablet(s) Oral at bedtime  sodium chloride 0.9%. 1000 milliLiter(s) (75 mL/Hr) IV Continuous <Continuous>    MEDICATIONS  (PRN):  acetaminophen   Tablet .. 650 milliGRAM(s) Oral every 6 hours PRN Temp greater or equal to 38C (100.4F), Mild Pain (1 - 3)  hydrALAZINE Injectable 10 milliGRAM(s) IV Push every 2 hours PRN SBP > 140  ondansetron Injectable 4 milliGRAM(s) IV Push every 6 hours PRN Nausea and/or Vomiting        Social History:                -  Home Living Status:  lives with his wife and 2 teenage children in a private house 3 steps to enter         -  Prior Home Care Services:   none         -  Occupation: elevator repairman    Baseline Functional Level Prior to Admission:             - ADL's/ IADL's:  independent         - ambulatory status PTA:  walked without assistive devices    FAMILY HISTORY:      CBC Full  -  ( 03 Jun 2021 05:38 )  WBC Count : 7.91 K/uL  RBC Count : 5.34 M/uL  Hemoglobin : 14.5 g/dL  Hematocrit : 45.1 %  Platelet Count - Automated : 263 K/uL  Mean Cell Volume : 84.5 fl  Mean Cell Hemoglobin : 27.2 pg  Mean Cell Hemoglobin Concentration : 32.2 gm/dL  Auto Neutrophil # : x  Auto Lymphocyte # : x  Auto Monocyte # : x  Auto Eosinophil # : x  Auto Basophil # : x  Auto Neutrophil % : x  Auto Lymphocyte % : x  Auto Monocyte % : x  Auto Eosinophil % : x  Auto Basophil % : x      06-03    138  |  103  |  13  ----------------------------<  110<H>  4.0   |  25  |  0.90    Ca    9.0      03 Jun 2021 05:38  Phos  3.5     06-03  Mg     2.3     06-03    TPro  7.6  /  Alb  4.5  /  TBili  0.4  /  DBili  x   /  AST  30  /  ALT  68<H>  /  AlkPhos  107  06-02            Radiology:    < from: CT Brain Stroke Protocol (06.02.21 @ 14:10) >  EXAM:  CT BRAIN STROKE PROTOCOL                          PROCEDURE DATE:  06/02/2021          INTERPRETATION:  I, Mel Mcneal MD, have reviewed the images and the report and agree with the findings, with the following modification:    There is an approximately 3 cm focus of hemorrhage within the right basal ganglia with mild surrounding edema.    ******PRELIMINARY REPORT******    INDICATIONS: Stroke code. Left facial droop and weakness.    TECHNIQUE: Serial axial images were obtained from the skull base to the vertex without the use of intravenous contrast. Sagittal and coronal reformations are provided.  COMPARISON EXAMINATION: None.    FINDINGS:  VENTRICLES AND SULCI: Normal.  INTRA-AXIAL: 3.3 x 1.9 x 3 cm ovoid hyperdense lesion right lentiform nucleus compatible with blood products. There is mild patchy hypodensity within the periventricular white matter which is nonspecific and may represent the sequela of small vessel ischemic disease. The gray-white differentiation is otherwise preserved without acute transcortical infarction. No mass effect or midline shift.  EXTRA-AXIAL: No extra-axial fluid collection is present.  VISUALIZED SINUSES: No air-fluid levels are identified.  VISUALIZED MASTOIDS: Well developed and aerated bilaterally.  CALVARIUM: No calvarial fracture.    IMPRESSION:  There is a 3.3 cm hematoma centered in the right basal ganglia. In addition, there is an age-indeterminate infarction right caudate head.      < from: CT Angio Head w/ IV Cont (06.02.21 @ 14:11) >    EXAM:  CT ANGIO NECK (W)AW IC                          EXAM:  CT ANGIO BRAIN (W)AW IC                          PROCEDURE DATE:  06/02/2021          INTERPRETATION:  PROCEDURE: CTA brain with and without intravenous contrast.    INDICATION: Left-sided weakness    TECHNIQUE: Multiple thin section axial images were obtained through the [place] following the intravenous injection of 90 ml of Optiray 350. Multiple 3-D reformatted images were generated from the axial cuts.    COMPARISON: None    FINDINGS: The CTA examination demonstrates the internal carotid arteries to be normal in caliber. There is a normal bifurcation into the A1 and M1 segments. Left A1 segment is hypoplastic. The anterior communicating artery is present. The A2 segments appear intact. There is a normal MCA bifurcation. The right vertebral artery is underdeveloped. There is calcification along the V4 segment of the left vertebral artery with mild narrowing (series 10 image 406) The vertebral arteries are normal in caliber. The basilar artery is normal in caliber. There is a normal bifurcation into the posterior cerebral arteries. There is no aneurysm or arteriovenous shunting.    IMPRESSION: No large vessel occlusion. Mild narrowing involving the left V4 segment.      PROCEDURE: CTA neck with and without intravenous contrast.    INDICATION: Left-sided weakness    TECHNIQUE: Multiple axial thin section were obtained through the neck following the intravenous injection of 90 ml of Optiray 350. Multiple 3-D reformatted images were generated from the axial cuts.    COMPARISON: None    FINDINGS: The CTA examination demonstrates the right common carotid artery to be normal in caliber. There is a normal bifurcation into the right internal and external carotid arteries. Is minimum calcification at the right carotid bifurcation and proximal right internal carotid artery There is no hemodynamically significant stenosis. There is a looped configuration to the distal right internal carotid artery.    The leftcommon carotid artery is normal in caliber. There is a normal bifurcation into the left internal and external carotid arteries. There is no hemodynamically significant stenosis. There is a looped configuration to the midportion of the left internal carotid artery.    The vertebral arteries are normal in caliber.    The aortic arch appears intact without narrowing of the origin of the great vessels.    IMPRESSION: Normal CTA of the neck.                  Vital Signs Last 24 Hrs  T(C): 36.4 (03 Jun 2021 06:03), Max: 37.1 (02 Jun 2021 13:56)  T(F): 97.6 (03 Jun 2021 06:03), Max: 98.7 (02 Jun 2021 13:56)  HR: 64 (03 Jun 2021 09:00) (59 - 108)  BP: 126/60 (03 Jun 2021 09:00) (117/59 - 229/140)  BP(mean): 86 (03 Jun 2021 09:00) (69 - 133)  RR: 17 (03 Jun 2021 09:00) (14 - 37)  SpO2: 95% (03 Jun 2021 09:00) (91% - 99%)        REVIEW OF SYSTEMS:    CONSTITUTIONAL: No fever, weight loss, or fatigue  EYES: No eye pain, visual disturbances, or discharge  ENMT:  No difficulty hearing, tinnitus, vertigo; No sinus or throat pain  NECK: No pain or stiffness  BREASTS: No pain, masses, or nipple discharge  RESPIRATORY: No cough, wheezing, chills or hemoptysis; No shortness of breath  CARDIOVASCULAR: No chest pain, palpitations, dizziness, or leg swelling  GASTROINTESTINAL: No abdominal or epigastric pain. No nausea, vomiting, or hematemesis; No diarrhea or constipation. No melena or hematochezia.  GENITOURINARY: No dysuria, frequency, hematuria, or incontinence  NEUROLOGICAL: left arm weakness, left facial droop  SKIN: No itching, burning, rashes, or lesions   LYMPH NODES: No enlarged glands  ENDOCRINE: No heat or cold intolerance; No hair loss  MUSCULOSKELETAL: No joint pain or swelling; No muscle, back, or extremity pain  PSYCHIATRIC: No depression, anxiety, mood swings, or difficulty sleeping  HEME/LYMPH: No easy bruising, or bleeding gums  ALLERGY AND IMMUNOLOGIC: No hives or eczema  VASCULAR: no swelling, erythema,   : no dysuria, hematuria, frequency        Physical Exam:  in MICU, wife present at bedside, overweight 45 yo  gentleman sitting in a chair, awake/alert, reports feeling better    Head: normocephalic, atraumatic    Eyes: PERRLA, EOMI, no nystagmus, sclera anicteric    ENT: nasal discharge, uvula midline, no oropharyngeal erythema/exudate    Neck: supple, negative JVD, negative carotid bruits, no thyromegaly    Chest: CTA bilaterally, neg wheeze/ rhonchi/ rales/ crackles/ egophany    Cardiovascular: regular rate and rhythm, neg murmurs/rubs/gallops    Abdomen: soft, non distended, non tender to palpation in all 4 quadrants, negative rebound/guarding, normal bowel sounds    Extremities: WWP, neg cyanosis/clubbing/edema, negative calf tenderness to palpation, negative Todd's sign    Musculoskeletal:    Skin:      :     Neurologic Exam:    Alert and oriented to person, place, date/year, speech fluent w/ slight dysarthria, follows commands, recent and remote memory intact, repetition intact, comprehension intact,  attention/concentration intact, fund of knowledge appropriate    Cranial Nerves:     II:                         pupils equal, round and reactive to light, visual fields intact   III/ IV/VI:             extraocular movements intact, neg nystagmus, neg ptosis  V:                        slight dec left facial densation  VII:                      left droop, normal eye closure, dec left brow raise  VIII:                     hearing intact to finger rub bilaterally  IX and X:             no hoarseness, gag intact, palate/ uvula rise symmetrically  XI:                       SCM/ trapezius strength intact bilateral  XII:                      left tongue deviation    Motor Exam:    Right UE:            : 5/5                             wrist extensors/ flexors: 5/5                             biceps:   5/5                             triceps:  5/5                             deltoid:  5/5                             pronator drift: neg    Left UE:              :  5/5                             wrist extensors/ flexors:  5/5                             biceps:    5/5                             triceps:   5/5                             deltoid:  5/5                             pronator drift: slight      Right LE:            dorsiflexors:  5/5                            plantar flexors:  5/5                            quadriceps:  5/5                            hamstrings:  5/5                            hip flexors:  5/5    Left LE:              dorsiflexors:  5/5                            plantar flexors:  5/5                            quadriceps:  5/5                            hamstrings:  5/5                            hip flexors:  5/5               Sensation:           extinction on double simultaneous testing                       DTR:                  biceps/brachioradialis: equal bilaterally                                                     patella/ankle: equal bilaterally                                                     neg clonus                           neg Babinski                        Coordination:          Finger to Nose:  R:  neg          L:  slight dysmetria          Heel to shin: wnl bilaterally                          Rapid Alternating movements:              R:  nl           L: + dysdiadochokinesia    Joint Position Sense: wnl bilaterally            Gait:  not tested        PM&R Impression:    1) s/p 3.3 cm hematoma in the right basal ganglia. In addition, there is an age-indeterminate infarction right caudate head  2) Left facial droop, mild dysarthria, Left UE dyscoordination with normal motor strength      Plan:    1) Physical therapy focusing on therapeutic exercises, bed mobility/transfer out of bed evaluation, progressive ambulation with assistive devices prn.    2) Anticipated Disposition Plan/Recs:    d/c home

## 2021-06-03 NOTE — OCCUPATIONAL THERAPY INITIAL EVALUATION ADULT - PERTINENT HX OF CURRENT PROBLEM, REHAB EVAL
47 y/o M HTN who came through the ED BIBEMS for left upper extremity weakness and left facial droop. CTH showing a 3.3cm hematoma of the right basal ganglia. Admitted to NSICU for close neuromonitoring. NIHSS 5, ICH score 0. 6/3: Repeat CTH performed overnight- stable bleed. SBP goal 100-140. Cardene resumed at 6am for SBP control.

## 2021-06-03 NOTE — PHYSICAL THERAPY INITIAL EVALUATION ADULT - COORDINATION ASSESSED, REHAB EVAL
Finger to nose: WNL right UE. Mild decreased coordination left UE.  Heel to shin WNL bilaterally/finger to nose/heel to shin

## 2021-06-03 NOTE — PROCEDURAL SAFETY CHECKLIST WITH OR WITHOUT SEDATION - NSPOSTCOMMENTFT_GEN_ALL_CORE
Patient tolerated procedure well, vital signs stable at this time. Education provided to patient and wife regarding indication for arterial line. Patient tolerated procedure well, vital signs stable at this time. Education provided to patient and wife regarding indication for arterial line. Unable to obtain blood return from arterial line, plan to re-attempt arterial line insertion at later time.

## 2021-06-03 NOTE — PHYSICAL THERAPY INITIAL EVALUATION ADULT - MODALITIES TREATMENT COMMENTS
Quadrant test: WNL. H test: able to track to all fields. Smile decreased on left. tongue protrusion with slight deviation to left but can move tongue from side to side. Opens/closes eyes, raises eyebrows intact bilaterally Quadrant test: WNL. H test: able to track to all fields. Smile decreased on left. tongue protrusion with slight deviation to left but can move tongue from side to side. Opens/closes eyes, raises eyebrows intact bilaterally. Puffs cheeks intact bilaterally. Shrugs shoulders WNL bilaterally. Hearing intact bilaterally. SILT face

## 2021-06-03 NOTE — PROGRESS NOTE ADULT - ASSESSMENT
NEURO:  admit to ICU q 4 hr neuro checks  Seizure Prophylaxis: none  Activity: [x] PT [x] OT     PULM:  IS      CV:  SBP goal < 160 mmHg  Nicardipine gtt PRN    RENAL:  Fluids:   Subramanian for strict I/Os    GI:  Diet: dysphagia adat   GI prophylaxis [] not indicated   Bowel regimen  [x] senna [] other:    ENDO:   Goal euglycemia (-180)  Check HbA1c and lipids      HEME/ONC:  VTE prophylaxis: [x] SCDs [] chemoprophylaxis [x] hold chemoprophylaxis     ID:  Monitor for fever    SOCIAL/FAMILY:  [x] family meeting     CODE STATUS:  [x] Full Code     DISPOSITION:  stroke team, stroke unit

## 2021-06-04 ENCOUNTER — TRANSCRIPTION ENCOUNTER (OUTPATIENT)
Age: 46
End: 2021-06-04

## 2021-06-04 LAB
ANION GAP SERPL CALC-SCNC: 12 MMOL/L — SIGNIFICANT CHANGE UP (ref 5–17)
BUN SERPL-MCNC: 13 MG/DL — SIGNIFICANT CHANGE UP (ref 7–23)
CALCIUM SERPL-MCNC: 9 MG/DL — SIGNIFICANT CHANGE UP (ref 8.4–10.5)
CHLORIDE SERPL-SCNC: 103 MMOL/L — SIGNIFICANT CHANGE UP (ref 96–108)
CO2 SERPL-SCNC: 21 MMOL/L — LOW (ref 22–31)
CREAT SERPL-MCNC: 0.89 MG/DL — SIGNIFICANT CHANGE UP (ref 0.5–1.3)
GLUCOSE SERPL-MCNC: 101 MG/DL — HIGH (ref 70–99)
HCT VFR BLD CALC: 46.6 % — SIGNIFICANT CHANGE UP (ref 39–50)
HGB BLD-MCNC: 14.9 G/DL — SIGNIFICANT CHANGE UP (ref 13–17)
MAGNESIUM SERPL-MCNC: 2.2 MG/DL — SIGNIFICANT CHANGE UP (ref 1.6–2.6)
MCHC RBC-ENTMCNC: 27.2 PG — SIGNIFICANT CHANGE UP (ref 27–34)
MCHC RBC-ENTMCNC: 32 GM/DL — SIGNIFICANT CHANGE UP (ref 32–36)
MCV RBC AUTO: 85 FL — SIGNIFICANT CHANGE UP (ref 80–100)
NRBC # BLD: 0 /100 WBCS — SIGNIFICANT CHANGE UP (ref 0–0)
PHOSPHATE SERPL-MCNC: 3 MG/DL — SIGNIFICANT CHANGE UP (ref 2.5–4.5)
PLATELET # BLD AUTO: 266 K/UL — SIGNIFICANT CHANGE UP (ref 150–400)
POTASSIUM SERPL-MCNC: 4.1 MMOL/L — SIGNIFICANT CHANGE UP (ref 3.5–5.3)
POTASSIUM SERPL-SCNC: 4.1 MMOL/L — SIGNIFICANT CHANGE UP (ref 3.5–5.3)
RBC # BLD: 5.48 M/UL — SIGNIFICANT CHANGE UP (ref 4.2–5.8)
RBC # FLD: 13.8 % — SIGNIFICANT CHANGE UP (ref 10.3–14.5)
SODIUM SERPL-SCNC: 136 MMOL/L — SIGNIFICANT CHANGE UP (ref 135–145)
WBC # BLD: 9.8 K/UL — SIGNIFICANT CHANGE UP (ref 3.8–10.5)
WBC # FLD AUTO: 9.8 K/UL — SIGNIFICANT CHANGE UP (ref 3.8–10.5)

## 2021-06-04 PROCEDURE — 93970 EXTREMITY STUDY: CPT | Mod: 26

## 2021-06-04 PROCEDURE — 99232 SBSQ HOSP IP/OBS MODERATE 35: CPT

## 2021-06-04 PROCEDURE — 93306 TTE W/DOPPLER COMPLETE: CPT | Mod: 26

## 2021-06-04 PROCEDURE — 99255 IP/OBS CONSLTJ NEW/EST HI 80: CPT

## 2021-06-04 RX ADMIN — Medication 650 MILLIGRAM(S): at 10:04

## 2021-06-04 RX ADMIN — Medication 10 MILLIGRAM(S): at 00:21

## 2021-06-04 RX ADMIN — Medication 10 MILLIGRAM(S): at 14:08

## 2021-06-04 RX ADMIN — Medication 10 MILLIGRAM(S): at 18:22

## 2021-06-04 RX ADMIN — Medication 650 MILLIGRAM(S): at 09:18

## 2021-06-04 RX ADMIN — LOSARTAN POTASSIUM 100 MILLIGRAM(S): 100 TABLET, FILM COATED ORAL at 04:30

## 2021-06-04 RX ADMIN — Medication 10 MILLIGRAM(S): at 05:51

## 2021-06-04 RX ADMIN — ATORVASTATIN CALCIUM 80 MILLIGRAM(S): 80 TABLET, FILM COATED ORAL at 21:15

## 2021-06-04 NOTE — CONSULT NOTE ADULT - REASON FOR ADMISSION
3.3 cm hematoma centered in the right basal ganglia. Infarction right caudate head.
3.3 cm hematoma centered in the right basal ganglia. Infarction right caudate head.

## 2021-06-04 NOTE — CONSULT NOTE ADULT - SUBJECTIVE AND OBJECTIVE BOX
ISH GRAYSON  46y  Male    Patient is a 46y old  Male who presents with a chief complaint of 3.3 cm hematoma centered in the right basal ganglia. Infarction right caudate head. (2021 14:30)      HPI:  47 y/o M with PMhx of HTN, takes one medication which he does not remember the name of, no anticoagulation including no aspirin, but notes compliance with his medication. Patient also mentions the use of 4-5 beers per night. 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 EMS called in stroke notification and code stroke was called prior to arrival, taken immediately to CT scan. As per patient denies headache, blurry vison. Notes more trouble moving LUE than moving LLE, no neck pain, fever, infectious symptoms. Denies drug use. (2021 15:37)    Initially under NSGY now transferred to Stroke Service.  Seen by me this morning, doing well, feels better. States having some left shoulder pain and that the strength of the arm has improved. Denies any current headache, dizziness or lightheadedness, no blurry vision, SOB or chest pain. Denies having any speech issues.    PAST MEDICAL & SURGICAL HISTORY:  HTN    Home Medications:  losartan 100 mg oral tablet: 1 tab(s) orally once a day (2021 13:39)    46y    FAMILY HISTORY:  Father  of a brain aneurysm at age 49  No history of CVA in 1st degree relatives      Marital Status:  ( X )    (   ) Single    (   )    (  )   Lives with: (  ) alone  (  ) children   (  ) spouse   (  ) parents  (  ) other  Recent Travel: No recent travel  Occupation:     Substance Use (street drugs): ( x ) never used  (  ) other:  Tobacco Usage:  ( x  ) never smoked   (   ) former smoker   (   ) current smoker  (     ) pack year  Alcohol Usage: 4-5 beers per night      MEDICATIONS  (STANDING):  atorvastatin 80 milliGRAM(s) Oral at bedtime  bisacodyl 5 milliGRAM(s) Oral at bedtime  losartan 100 milliGRAM(s) Oral daily  senna 2 Tablet(s) Oral at bedtime    MEDICATIONS  (PRN):  acetaminophen   Tablet .. 650 milliGRAM(s) Oral every 6 hours PRN Mild Pain (1 - 3)  hydrALAZINE Injectable 10 milliGRAM(s) IV Push every 2 hours PRN SBP > 140    REVIEW OF SYSTEMS:  As HPI, otherwise reviewed and negative    Vital Signs Last 24 Hrs  T(C): 37.2 (2021 18:20), Max: 37.2 (2021 18:20)  T(F): 99 (2021 18:20), Max: 99 (2021 18:20)  HR: 100 (2021 18:38) (58 - 100)  BP: 137/72 (2021 18:38) (127/74 - 172/80)  BP(mean): 97 (2021 18:38) (93 - 115)  RR: 18 (2021 18:38) (16 - 18)  SpO2: 98% (2021 18:38) (93% - 100%)    PHYSICAL EXAM:  GENERAL: NAD, well-groomed, well-developed, mild dysarthria  HEAD:  Atraumatic, Normocephalic  EYES: EOMI, PERRLA, conjunctiva and sclera clear  ENMT: No tonsillar erythema, exudates, or enlargement; Moist mucous membranes, Good dentition, No lesions  NECK: Supple, No JVD, Normal thyroid  NERVOUS SYSTEM:  Alert & Oriented X3, Good concentration; Mild dysarthria, mild L facial droop, Motor Strength 5/5 R side, LUE 4/5, LLE 5/5  CHEST/LUNG: Clear to percussion bilaterally; No rales, rhonchi, wheezing, or rubs  HEART: Regular rate and rhythm; No murmurs, rubs, or gallops  ABDOMEN: Soft, Nontender, Nondistended; Bowel sounds present  EXTREMITIES:  2+ Peripheral Pulses, No clubbing, cyanosis, or edema  LYMPH: No lymphadenopathy noted  SKIN: No rashes or lesions    Consultant(s) Notes Reviewed:  [x ] YES  [ ] NO  Care Discussed with Consultants/Other Providers [ x] YES  [ ] NO    LABS:                        14.9   9.80  )-----------( 266      ( 2021 07:50 )             46.6     06-04    136  |  103  |  13  ----------------------------<  101<H>  4.1   |  21<L>  |  0.89    Ca    9.0      2021 07:50  Phos  3.0     06-  Mg     2.2     06-    TPro  7.5  /  Alb  4.1  /  TBili  0.4  /  DBili  <0.2  /  AST  33  /  ALT  48<H>  /  AlkPhos  91  06-        CAPILLARY BLOOD GLUCOSE            RADIOLOGY & ADDITIONAL TESTS:  Reviewed by me

## 2021-06-04 NOTE — DISCHARGE NOTE PROVIDER - HOSPITAL COURSE
Hospital course:  46y Male with PMH hypertension, presented to [facility] for an LVO stroke code for slurred speech and left sided weakness on 6/2. He was found to have a right basal ganglia hematoma as below. Pt was hypertensive on arrival and initially on cardene drip, now back on his home losartan. Neurosurgery consulted - no intervention needed. Patient remained neurologically stable with 2 stable CT scans, and cleared for discharge home with outpatient PT.     During this hospital course, patient had a hemorrhagic stroke located in the right basal ganglia. The stroke etiology is likely secondary to:  []atrial fibrillation  [x]small vessel disease from atherosclerotic risk factors - uncontrolled hypertension  []other:  []etiology workup still in progress    Patient had the following workup done in house:  CT Head:   6/2/2021  IMPRESSION: Stable right basal ganglia hemorrhage with mild surrounding edema.    MR Head with and without contrast:  CT Angio Head:   < from: CT Angio Neck w/ IV Cont (06.02.21 @ 14:12) >    IMPRESSION: No large vessel occlusion. Mild narrowing involving the left V4 segment.    CT Angio Neck:  < from: CT Angio Neck w/ IV Cont (06.02.21 @ 14:12) >  IMPRESSION: Normal CTA of the neck.    []echo      []labs  []other    Physical exam at discharge:   Neurologic:  -Mental status: Awake, alert, oriented to person, place, and time. Dysarthria improving. Recent and remote memory intact. Follows commands. Attention/concentration intact. Fund of knowledge appropriate.  -Cranial nerves:   II: Visual fields are full to confrontation.  III, IV, VI: Extraocular movements are intact without nystagmus. Pupils equally round and reactive to light  V:  Facial sensation V1-V3 equal and intact   VII: Left sided facial droop   VIII: Hearing is bilaterally intact to finger rub  Motor: RUE 5/5 strength no deficits. LUE 4/5 strength bicep/tricep/ 4-/5 hand strength. LUE improving in strength since yesterday. BLE 5/5, no deficits.   Sensation: Intact to light touch bilaterally. No neglect or extinction on double simultaneous testing.  Coordination: No dysmetria on finger-to-nose and heel-to-shin bilaterally  Reflexes: Downgoing toes bilaterally   Gait: Narrow gait and steady    New medications on discharge: Atorvastatin  Labs to be followed up:  Imaging to be done as outpatient:  Further outpatient workup:   Hospital course:  46y Male with PMH hypertension, presented to [facility] for an LVO stroke code for slurred speech and left sided weakness on 6/2. He was found to have a right basal ganglia hematoma as below. Pt was hypertensive on arrival and initially on cardene drip, now back on his home losartan. Neurosurgery consulted - no intervention needed. Patient remained neurologically stable with 2 stable CT scans, and cleared for discharge home with outpatient PT.     During this hospital course, patient had a hemorrhagic stroke located in the right basal ganglia. The stroke etiology is likely secondary to:  []atrial fibrillation  [x]small vessel disease from atherosclerotic risk factors - uncontrolled hypertension  []other:  []etiology workup still in progress    Patient had the following workup done in house:  CT Head:   6/2/2021  IMPRESSION: Stable right basal ganglia hemorrhage with mild surrounding edema.    MR Head with and without contrast:  CT Angio Head:   < from: CT Angio Neck w/ IV Cont (06.02.21 @ 14:12) >    IMPRESSION: No large vessel occlusion. Mild narrowing involving the left V4 segment.    CT Angio Neck:  < from: CT Angio Neck w/ IV Cont (06.02.21 @ 14:12) >  IMPRESSION: Normal CTA of the neck.    []echo      [x] labs: , A1c 6.1    Physical exam at discharge:   Neurologic:  -Mental status: Awake, alert, oriented to person, place, and time. Dysarthria improving. Recent and remote memory intact. Follows commands. Attention/concentration intact. Fund of knowledge appropriate.  -Cranial nerves:   II: Visual fields are full to confrontation.  III, IV, VI: Extraocular movements are intact without nystagmus. Pupils equally round and reactive to light  V:  Facial sensation V1-V3 equal and intact   VII: Left sided facial droop   VIII: Hearing is bilaterally intact to finger rub  Motor: RUE 5/5 strength no deficits. LUE 4/5 strength bicep/tricep/ 4-/5 hand strength. LUE improving in strength since yesterday. BLE 5/5, no deficits.   Sensation: Intact to light touch bilaterally. No neglect or extinction on double simultaneous testing.  Coordination: No dysmetria on finger-to-nose and heel-to-shin bilaterally  Reflexes: Downgoing toes bilaterally   Gait: Narrow gait and steady    New medications on discharge: Atorvastatin  Labs to be followed up:  Imaging to be done as outpatient:  Further outpatient workup:   Hospital course:  46y Male with PMH hypertension, presented to [facility] for an LVO stroke code for slurred speech and left sided weakness on 6/2. He was found to have a right basal ganglia hematoma as below. Pt was hypertensive on arrival and initially on cardene drip, now back on his home losartan. Neurosurgery consulted - no intervention needed. Patient remained neurologically stable with 2 stable CT scans, and cleared for discharge home with outpatient PT.     During this hospital course, patient had a hemorrhagic stroke located in the right basal ganglia. The stroke etiology is likely secondary to:  []atrial fibrillation  [x]small vessel disease from atherosclerotic risk factors - uncontrolled hypertension  []other:  []etiology workup still in progress    Patient had the following workup done in house:  CT Head:   6/2/2021  IMPRESSION: Stable right basal ganglia hemorrhage with mild surrounding edema.    MR Head with and without contrast:  CT Angio Head:   < from: CT Angio Neck w/ IV Cont (06.02.21 @ 14:12) >    IMPRESSION: No large vessel occlusion. Mild narrowing involving the left V4 segment.    CT Angio Neck:  < from: CT Angio Neck w/ IV Cont (06.02.21 @ 14:12) >  IMPRESSION: Normal CTA of the neck.    [x] echo: 1. Mild symmetric left ventricular hypertrophy.   2. Hyperdynamic left ventricular systolic function.   3. Normal right ventricular size and systolic function.   4. Injection of agitated saline via a peripheral vein reveals no evidence of a right-to-left shunt.   5. Aortic sclerosis without significant stenosis.   6. No evidence of pulmonary hypertension.   7. No pericardial effusion.        [x] labs: , A1c 6.1    Physical exam at discharge:   Neurologic:  -Mental status: Awake, alert, oriented to person, place, and time. Dysarthria improving. Recent and remote memory intact. Follows commands. Attention/concentration intact. Fund of knowledge appropriate.  -Cranial nerves:   II: Visual fields are full to confrontation.  III, IV, VI: Extraocular movements are intact without nystagmus. Pupils equally round and reactive to light  V:  Facial sensation V1-V3 equal and intact   VII: Left sided facial droop   VIII: Hearing is bilaterally intact to finger rub  Motor: RUE 5/5 strength no deficits. LUE 4/5 strength bicep/tricep/ 4-/5 hand strength. LUE improving in strength since yesterday. BLE 5/5, no deficits.   Sensation: Intact to light touch bilaterally. No neglect or extinction on double simultaneous testing.  Coordination: No dysmetria on finger-to-nose and heel-to-shin bilaterally  Reflexes: Downgoing toes bilaterally   Gait: Narrow gait and steady    New medications on discharge: Atorvastatin  Labs to be followed up:  Imaging to be done as outpatient:  Further outpatient workup:   Hospital course:  46y Male with PMH hypertension, presented to [facility] for an LVO stroke code for slurred speech and left sided weakness on 6/2. He was found to have a right basal ganglia hematoma as below. Pt was hypertensive on arrival and initially on cardene drip, now back on his home losartan. Neurosurgery consulted - no intervention needed. Patient remained neurologically stable with 2 stable CT scans, and cleared for discharge home with outpatient PT.     During this hospital course, patient had a hemorrhagic stroke located in the right basal ganglia. The stroke etiology is likely secondary to:  []atrial fibrillation  [x]small vessel disease from atherosclerotic risk factors - uncontrolled hypertension  []other:  []etiology workup still in progress    Patient had the following workup done in house:  CT Head:   6/2/2021  IMPRESSION: Stable right basal ganglia hemorrhage with mild surrounding edema.    MR Head with and without contrast:    CT Angio Head:   < from: CT Angio Neck w/ IV Cont (06.02.21 @ 14:12) >    IMPRESSION: No large vessel occlusion. Mild narrowing involving the left V4 segment.    CT Angio Neck:  < from: CT Angio Neck w/ IV Cont (06.02.21 @ 14:12) >  IMPRESSION: Normal CTA of the neck.    [x] echo: 1. Mild symmetric left ventricular hypertrophy.   2. Hyperdynamic left ventricular systolic function.   3. Normal right ventricular size and systolic function.   4. Injection of agitated saline via a peripheral vein reveals no evidence of a right-to-left shunt.   5. Aortic sclerosis without significant stenosis.   6. No evidence of pulmonary hypertension.   7. No pericardial effusion.    [x] labs: , A1c 6.1    Physical exam at discharge:   Neurologic:  -Mental status: Awake, alert, oriented to person, place, and time. trace Dysarthria. Recent and remote memory intact. Follows commands. Attention/concentration intact. Fund of knowledge appropriate.  -Cranial nerves:   II: Visual fields are full to confrontation.  III, IV, VI: Extraocular movements are intact without nystagmus. Pupils equally round and reactive to light  V:  Facial sensation V1-V3 equal and intact   VII: Trace Left sided facial droop   VIII: Hearing is bilaterally intact to finger rub  Motor: RUE 5/5 strength no deficits. RLE 5/5 LUE 4/5 strength bicep/tricep/ 4-/5 hand strength. BLE 5/5, no deficits.   Sensation: Intact to light touch bilaterally. No neglect or extinction on double simultaneous testing.  Coordination: No dysmetria on finger-to-nose and heel-to-shin bilaterally  Reflexes: Downgoing toes bilaterally   Gait: Narrow gait and steady    New medications on discharge: Atorvastatin 80mg, amlodipine 5mg, aspirin 81mg, plavix 75mg,   Labs to be followed up: n/a  Imaging to be done as outpatient: repeat MRI head with/without in 3-6 months as per outpatient neurology  Further outpatient workup: Follow up    Hospital course:  46y Male with PMH hypertension, presented to [facility] for an LVO stroke code for slurred speech and left sided weakness on 6/2. He was found to have a right basal ganglia hematoma as below. Pt was hypertensive on arrival and initially on cardene drip, now back on his home losartan. Neurosurgery consulted - no intervention needed. Patient remained neurologically stable with 2 stable CT scans, and cleared for discharge home with outpatient PT.     During this hospital course, patient had a hemorrhagic stroke located in the right basal ganglia. The stroke etiology is likely secondary to:  []atrial fibrillation  [x]small vessel disease from atherosclerotic risk factors - uncontrolled hypertension  []other:  []etiology workup still in progress    Patient had the following workup done in house:  CT Head:   6/2/2021  IMPRESSION: Stable right basal ganglia hemorrhage with mild surrounding edema.    MR Head with and without contrast: < from: MR Head w/wo IV Cont (06.05.21 @ 11:27) >    FINDINGS: The MRI examination of the brain demonstrates a approximately 2.0 cm with x 3.6 cm AP x 3.0 cm height focus of intraparenchymal hemorrhage within the right basal ganglia. The hematoma demonstrates mildly hyperintense signal on the T1-weighted images and is predominantly hypointense signal on the T2-weighted images compatible with early subacute hematoma. There is surrounding FLAIR/T2 signal abnormality compatible with edema. There is mild mass affect on the right frontal horn lateral ventricle and atria.    The ventricles, cisternal spaces, and cortical sulci are otherwise appropriate for the patient's stated age. There is no midline shift or extra-axial collection. There is a cystic cavity within the right caudate head which demonstrates mild susceptibility compatible with hemorrhagic products. This may represent old hemorrhagic lacunar infarct. The diffusion-weighted images demonstrate no acute ischemia. The postcontrast images demonstrateno abnormal enhancement. There is normal vascular flow-voids.    There is a large retention cyst within the left maxillary sinus. The visualized paranasal sinuses are free of mucosal disease. The mastoid air cells are well-aerated.    IMPRESSION: Approximately 2.0 x 3.6 x 3.0 cm right basal ganglia hematoma with surrounding edema. Old right caudate head hemorrhagic lacunar infarct.    < end of copied text >        CT Angio Head:   < from: CT Angio Neck w/ IV Cont (06.02.21 @ 14:12) >    IMPRESSION: No large vessel occlusion. Mild narrowing involving the left V4 segment.    CT Angio Neck:  < from: CT Angio Neck w/ IV Cont (06.02.21 @ 14:12) >  IMPRESSION: Normal CTA of the neck.    [x] echo: 1. Mild symmetric left ventricular hypertrophy.   2. Hyperdynamic left ventricular systolic function.   3. Normal right ventricular size and systolic function.   4. Injection of agitated saline via a peripheral vein reveals no evidence of a right-to-left shunt.   5. Aortic sclerosis without significant stenosis.   6. No evidence of pulmonary hypertension.   7. No pericardial effusion.    [x] labs: , A1c 6.1    Physical exam at discharge:   Neurologic:  -Mental status: Awake, alert, oriented to person, place, and time. trace Dysarthria. Recent and remote memory intact. Follows commands. Attention/concentration intact. Fund of knowledge appropriate.  -Cranial nerves:   II: Visual fields are full to confrontation.  III, IV, VI: Extraocular movements are intact without nystagmus. Pupils equally round and reactive to light  V:  Facial sensation V1-V3 equal and intact   VII: Trace Left sided facial droop   VIII: Hearing is bilaterally intact to finger rub  Motor: RUE 5/5 strength no deficits. RLE 5/5 LUE 4/5 strength bicep/tricep/ 4-/5 hand strength. BLE 5/5, no deficits.   Sensation: Intact to light touch bilaterally. No neglect or extinction on double simultaneous testing.  Coordination: No dysmetria on finger-to-nose and heel-to-shin bilaterally  Reflexes: Downgoing toes bilaterally   Gait: Narrow gait and steady    New medications on discharge: Atorvastatin 80mg, amlodipine 5mg, aspirin 81mg, plavix 75mg,   Labs to be followed up: n/a  Imaging to be done as outpatient: repeat MRI head with/without in 3-6 months as per outpatient neurology  Further outpatient workup: Follow up

## 2021-06-04 NOTE — PROGRESS NOTE ADULT - SUBJECTIVE AND OBJECTIVE BOX
Neurology Stroke Progress Note    INTERVAL HPI/OVERNIGHT EVENTS:  Patient seen and examined. Patient in bed resting comfortably.     MEDICATIONS  (STANDING):  atorvastatin 80 milliGRAM(s) Oral at bedtime  bisacodyl 5 milliGRAM(s) Oral at bedtime  losartan 100 milliGRAM(s) Oral daily  senna 2 Tablet(s) Oral at bedtime    MEDICATIONS  (PRN):  acetaminophen   Tablet .. 650 milliGRAM(s) Oral every 6 hours PRN Mild Pain (1 - 3)  hydrALAZINE Injectable 10 milliGRAM(s) IV Push every 2 hours PRN SBP > 140      Allergies    No Known Allergies    Intolerances        Vital Signs Last 24 Hrs  T(C): 36.6 (04 Jun 2021 09:00), Max: 37.1 (03 Jun 2021 17:36)  T(F): 97.8 (04 Jun 2021 09:00), Max: 98.8 (03 Jun 2021 17:36)  HR: 82 (04 Jun 2021 09:20) (58 - 92)  BP: 134/80 (04 Jun 2021 09:20) (118/62 - 172/80)  BP(mean): 101 (04 Jun 2021 09:20) (86 - 115)  RR: 18 (04 Jun 2021 09:20) (16 - 27)  SpO2: 95% (04 Jun 2021 09:20) (93% - 99%)    Physical exam:  General: No acute distress, awake and alert  Eyes: Anicteric sclerae, moist conjunctivae, see below for CNs  Neck: trachea midline, FROM, supple, no thyromegaly or lymphadenopathy  Cardiovascular: Regular rate and rhythm, no murmurs, rubs, or gallops. No carotid bruits.   Pulmonary: Anterior breath sounds clear bilaterally, no crackles or wheezing. No use of accessory muscles  GI: Abdomen soft, non-distended, non-tender  Extremities: Radial and DP pulses +2, no edema    Neurologic:  -Mental status: Awake, alert, oriented to person, place, and time. Dysarthria improving. Recent and remote memory intact. Follows commands. Attention/concentration intact. Fund of knowledge appropriate.  -Cranial nerves:   II: Visual fields are full to confrontation.  III, IV, VI: Extraocular movements are intact without nystagmus. Pupils equally round and reactive to light  V:  Facial sensation V1-V3 equal and intact   VII: Left sided facial droop   VIII: Hearing is bilaterally intact to finger rub  Motor: RUE 5/5 strength no deficits. LUE 4/5 strength bicep/tricep/ 4-/5 hand strength. LUE improving in strength since yesterday. BLE 5/5, no deficits.   Sensation: Intact to light touch bilaterally. No neglect or extinction on double simultaneous testing.  Coordination: No dysmetria on finger-to-nose and heel-to-shin bilaterally  Reflexes: Downgoing toes bilaterally   Gait: Narrow gait and steady    LABS:                        14.9   9.80  )-----------( 266      ( 04 Jun 2021 07:50 )             46.6     06-04    136  |  103  |  13  ----------------------------<  101<H>  4.1   |  21<L>  |  0.89    Ca    9.0      04 Jun 2021 07:50  Phos  3.0     06-04  Mg     2.2     06-04    TPro  7.6  /  Alb  4.5  /  TBili  0.4  /  DBili  x   /  AST  30  /  ALT  68<H>  /  AlkPhos  107  06-02    PT/INR - ( 02 Jun 2021 14:15 )   PT: 12.5 sec;   INR: 1.04          PTT - ( 02 Jun 2021 14:15 )  PTT:31.0 sec

## 2021-06-04 NOTE — DISCHARGE NOTE PROVIDER - NSDCFUADDINST_GEN_ALL_CORE_FT
Ask primary care doctor about clearance for activities such as: return to work, sex, riding bikes, etc

## 2021-06-04 NOTE — DISCHARGE NOTE PROVIDER - NSDCMRMEDTOKEN_GEN_ALL_CORE_FT
losartan 100 mg oral tablet: 1 tab(s) orally once a day  Ozempic: 0.5 milligram(s) subcutaneous once a week   amLODIPine 5 mg oral tablet: 1 tab(s) orally once a day (at bedtime) MDD:1  atorvastatin 80 mg oral tablet: 1 tab(s) orally once a day (at bedtime) MDD:1  losartan 100 mg oral tablet: 1 tab(s) orally once a day  Ozempic: 0.5 milligram(s) subcutaneous once a week

## 2021-06-04 NOTE — DISCHARGE NOTE PROVIDER - NSDCCPCAREPLAN_GEN_ALL_CORE_FT
PRINCIPAL DISCHARGE DIAGNOSIS  Diagnosis: Intracranial bleed  Assessment and Plan of Treatment: During this hospital admission, you had a hemorrhagic stroke, which means that you had a bleed in your brain likely from high blood pressure. This can damage areas in the brain that control other parts of the body.  You will follow up outpatient in the stroke clinic as scheduled.  Doing your regular tasks may be difficult after you've had a stroke, but you can learn new ways to manage your daily activities. In fact, doing daily activities may help you to regain muscle strength. Be patient, give yourself time to adjust, and appreciate the progress you make. For example, when showering or bathing, test the water temperature with a hand or foot that was not affected by the stroke, use grab bars, a shower seat, a hand-held showerhead, etc. It is normal to feel fatigue after a stroke, while some days may be worse than others, you will continue to improve.  Call 911 right away if you have any of the following symptoms of another stroke:  B: Balance: Sudden: Dizziness, loss of balance, or a sense of falling, difficulty with coordinating movement  E: Eyes: Sudden double vision or trouble seeing in one or both eyes  F: Face: Sudden uneven face  A: Arms (Legs): Sudden weakness, tingling, or loss of feeling on one side of your face or body  S: Speech: Sudden trouble talking or slurred speech, sudden difficulty understanding others  T: Time: Please call 911 right away and go to the emergency room  •Sudden, severe headache  •Blackouts or seizures        SECONDARY DISCHARGE DIAGNOSES  Diagnosis: Hypertension  Assessment and Plan of Treatment: Hypertension is the medical term for high blood pressure. Blood pressure refers to the pressure that blood applies to the inner walls of the arteries. Arteries carry blood from the heart to other organs and parts of the body. Untreated high blood pressure increases the strain on the heart and arteries, eventually causing organ damage. High blood pressure increases the risk of heart failure, heart attack (myocardial infarction), stroke, and kidney failure. High blood pressure does not usually cause any symptoms. Treatment of hypertension usually begins with lifestyle changes. Making these lifestyle changes involves little or no risk. Recommended changes often include reducing the amount of salt in your diet, losing weight if you are overweight or obese, avoiding drinking too much alcohol, stopping smoking and exercising at least 30 minutes per day most days of the week. If you are prescribed medication for your hypertension it is important to take these as prescribed to prevent the possible complications of uncontrolled hypertension.

## 2021-06-04 NOTE — CONSULT NOTE ADULT - ASSESSMENT
46 year old male with,    # R basal ganglia hemorrhage: care per Stroke  Last HCT from 6/2 with stable appearance, mild surrounding edema  Initial HCT also showed age-indeterminate infarction R caudate head (no prior CVA hx)  F/up Brain MRI  Apprec NSGY recs  TTE with EF >75, mild LVH, no e/o PFO  C/w statin, hold ASA at this time    # Hypertensive emergency: required cardene drip initially  SBP Goal <140  C/w losartan 100mg daily  If needed can add amlodipine 5mg daily for better BP control    # Pre-diabetes: with A1C of 6.1  Monitor BS for now  Diet education    # Elevated ALT: improved on repeat CMP  Possibly due to fatty liver  F/up as outpatient    # Hyperlipidemia: c/w statin    D/w Stroke Team, will continue to follow up with you     46 year old male with,    # R basal ganglia hemorrhage: care per Stroke  Last HCT from 6/2 with stable appearance, mild surrounding edema  Initial HCT also showed age-indeterminate infarction R caudate head (no prior CVA hx)  F/up Brain MRI  Apprec NSGY recs  TTE with EF >75, mild LVH, no e/o PFO  C/w statin, hold ASA at this time  PT/OT evals --> outpatient PT/OT  DVT PPx with SCD's    # Hypertensive emergency: required cardene drip initially  SBP Goal <140  C/w losartan 100mg daily  If needed can add amlodipine 5mg daily for better BP control    # Pre-diabetes: with A1C of 6.1  Monitor BS for now  Diet education    # Elevated ALT: improved on repeat CMP  Possibly due to fatty liver  F/up as outpatient    # Hyperlipidemia: c/w statin    D/w Stroke Team, will continue to follow up with you

## 2021-06-04 NOTE — PROGRESS NOTE ADULT - ASSESSMENT
45 yo M w/ PMH of HTN (on unknown medication), who presented for LVO stroke code for slurred speech, left sided weakness, with symptom onset 6/2/2021 1325. Pt hypertensive to the 220s/110s and was started on cardene drip. HCT showed 3.3 cm hematoma centered in the R basal ganglia and age indeterminate R caudate head infarct. CTA left V4. No neurological intervention per neurosurgery. Patient transferred to stroke neurology for medical management  of bleed.     Neuro  #Right putamen hemorrhage form uncontrolled HTN (at risk of edema & worsening neurologic function), asymptomatic old right caudate lacune, b/l V4 mild stenosis/athero  - hold aspirin for now as pt with hemorrhage, pending clearance from nsgy (likely in 1 week).    - nsgy recommends MRI brain with and without contrast   - continue atorvastatin 80mg daily  - q4hr stroke neuro checks and vitals  - Stroke education    Cards  #HTN  - strict BP control, Goal -140  - start losartan 50mg daily and titrate to home dose of 100mg as needed  - obtain TTE with bubble  - Stroke Code EKG Results: NSR with PACs     #HLD  - high dose statin as above in CVA  - LDL results: 161    Pulm  - call provider if SPO2 < 94%    GI  #Nutrition/Fluids/Electrolytes   - replete K<4 and Mg <2  - Diet: Regular  - IVF: none    Infectious Disease  - Stroke Code CXR results: Cardiomegaly/cardiac magnification.     Endocrine  #DM  - A1C results: 6.1    - TSH results: 1.570    DVT Prophylaxis  - SCDs for DVT prophylaxis       Dispo: pending PT/OT eval     Discussed daily hospital plans and goals with patient and wife at bedside in detail.    47 yo M w/ PMH of HTN (on unknown medication), who presented for LVO stroke code for slurred speech, left sided weakness, with symptom onset 6/2/2021 1325. Pt hypertensive to the 220s/110s and was started on cardene drip. HCT showed 3.3 cm hematoma centered in the R basal ganglia and age indeterminate R caudate head infarct. CTA left V4. No neurological intervention per neurosurgery. Patient transferred to stroke neurology for medical management  of bleed.     Neuro  #Right putamen hemorrhage form uncontrolled HTN (at risk of edema & worsening neurologic function), asymptomatic old right caudate lacune, b/l V4 mild stenosis/athero  - hold aspirin for now as pt with hemorrhage, pending clearance from nsgy (likely in 1 week).    - nsgy recommends MRI brain with and without contrast   - continue atorvastatin 80mg daily  - q4hr stroke neuro checks and vitals  - Stroke education    Cards  #HTN  - strict BP control, Goal -140  - start losartan 50mg daily and titrate to home dose of 100mg as needed  - obtain TTE with bubble-75%, mild conc.LVH, no PFO.  - Stroke Code EKG Results: NSR with PACs     #HLD  - high dose statin as above in CVA  - LDL results: 161    Pulm  - call provider if SPO2 < 94%    GI  #Nutrition/Fluids/Electrolytes   - replete K<4 and Mg <2  - Diet: Regular  - IVF: none    Infectious Disease  - Stroke Code CXR results: Cardiomegaly/cardiac magnification.     Endocrine  #DM  - A1C results: 6.1    - TSH results: 1.570    DVT Prophylaxis  - SCDs for DVT prophylaxis       Dispo: pending PT/OT eval     Discussed daily hospital plans and goals with patient and wife at bedside in detail.

## 2021-06-04 NOTE — DISCHARGE NOTE PROVIDER - NSDCFUADDAPPT_GEN_ALL_CORE_FT
Please follow up with your Primary Care doctor within 2 weeks of discharge Please follow up with your Primary Care doctor within 2 weeks of discharge.  Ok to follow up with Neurologist closer to home, if unable to get appointment please follow up with Dr. Hayes

## 2021-06-04 NOTE — DISCHARGE NOTE PROVIDER - INSTRUCTIONS
Dietary Approaches to Stop Hypertension (DASH) eating plan — The DASH eating plan combines many of the interventions noted above. It is high in fruits and vegetables, low-fat dairy, and fiber. Patients who strictly follow the DASH eating plan can also have fairly significant reductions in blood pressure, particularly when combined with a low-sodium diet.

## 2021-06-05 LAB
ANION GAP SERPL CALC-SCNC: 12 MMOL/L — SIGNIFICANT CHANGE UP (ref 5–17)
BASOPHILS # BLD AUTO: 0.05 K/UL — SIGNIFICANT CHANGE UP (ref 0–0.2)
BASOPHILS NFR BLD AUTO: 0.5 % — SIGNIFICANT CHANGE UP (ref 0–2)
BUN SERPL-MCNC: 16 MG/DL — SIGNIFICANT CHANGE UP (ref 7–23)
CALCIUM SERPL-MCNC: 9.5 MG/DL — SIGNIFICANT CHANGE UP (ref 8.4–10.5)
CHLORIDE SERPL-SCNC: 106 MMOL/L — SIGNIFICANT CHANGE UP (ref 96–108)
CO2 SERPL-SCNC: 22 MMOL/L — SIGNIFICANT CHANGE UP (ref 22–31)
CREAT SERPL-MCNC: 1 MG/DL — SIGNIFICANT CHANGE UP (ref 0.5–1.3)
EOSINOPHIL # BLD AUTO: 0.25 K/UL — SIGNIFICANT CHANGE UP (ref 0–0.5)
EOSINOPHIL NFR BLD AUTO: 2.6 % — SIGNIFICANT CHANGE UP (ref 0–6)
GLUCOSE SERPL-MCNC: 105 MG/DL — HIGH (ref 70–99)
HCT VFR BLD CALC: 49.1 % — SIGNIFICANT CHANGE UP (ref 39–50)
HGB BLD-MCNC: 15.7 G/DL — SIGNIFICANT CHANGE UP (ref 13–17)
IMM GRANULOCYTES NFR BLD AUTO: 0.3 % — SIGNIFICANT CHANGE UP (ref 0–1.5)
LYMPHOCYTES # BLD AUTO: 1.8 K/UL — SIGNIFICANT CHANGE UP (ref 1–3.3)
LYMPHOCYTES # BLD AUTO: 19 % — SIGNIFICANT CHANGE UP (ref 13–44)
MCHC RBC-ENTMCNC: 27.5 PG — SIGNIFICANT CHANGE UP (ref 27–34)
MCHC RBC-ENTMCNC: 32 GM/DL — SIGNIFICANT CHANGE UP (ref 32–36)
MCV RBC AUTO: 86 FL — SIGNIFICANT CHANGE UP (ref 80–100)
MONOCYTES # BLD AUTO: 0.92 K/UL — HIGH (ref 0–0.9)
MONOCYTES NFR BLD AUTO: 9.7 % — SIGNIFICANT CHANGE UP (ref 2–14)
NEUTROPHILS # BLD AUTO: 6.44 K/UL — SIGNIFICANT CHANGE UP (ref 1.8–7.4)
NEUTROPHILS NFR BLD AUTO: 67.9 % — SIGNIFICANT CHANGE UP (ref 43–77)
NRBC # BLD: 0 /100 WBCS — SIGNIFICANT CHANGE UP (ref 0–0)
PLATELET # BLD AUTO: 287 K/UL — SIGNIFICANT CHANGE UP (ref 150–400)
POTASSIUM SERPL-MCNC: 4.2 MMOL/L — SIGNIFICANT CHANGE UP (ref 3.5–5.3)
POTASSIUM SERPL-SCNC: 4.2 MMOL/L — SIGNIFICANT CHANGE UP (ref 3.5–5.3)
RBC # BLD: 5.71 M/UL — SIGNIFICANT CHANGE UP (ref 4.2–5.8)
RBC # FLD: 13.8 % — SIGNIFICANT CHANGE UP (ref 10.3–14.5)
SODIUM SERPL-SCNC: 140 MMOL/L — SIGNIFICANT CHANGE UP (ref 135–145)
WBC # BLD: 9.49 K/UL — SIGNIFICANT CHANGE UP (ref 3.8–10.5)
WBC # FLD AUTO: 9.49 K/UL — SIGNIFICANT CHANGE UP (ref 3.8–10.5)

## 2021-06-05 PROCEDURE — 99233 SBSQ HOSP IP/OBS HIGH 50: CPT

## 2021-06-05 PROCEDURE — 99253 IP/OBS CNSLTJ NEW/EST LOW 45: CPT

## 2021-06-05 PROCEDURE — 70553 MRI BRAIN STEM W/O & W/DYE: CPT | Mod: 26

## 2021-06-05 RX ORDER — AMLODIPINE BESYLATE 2.5 MG/1
5 TABLET ORAL AT BEDTIME
Refills: 0 | Status: DISCONTINUED | OUTPATIENT
Start: 2021-06-05 | End: 2021-06-06

## 2021-06-05 RX ADMIN — Medication 10 MILLIGRAM(S): at 12:24

## 2021-06-05 RX ADMIN — Medication 10 MILLIGRAM(S): at 04:50

## 2021-06-05 RX ADMIN — AMLODIPINE BESYLATE 5 MILLIGRAM(S): 2.5 TABLET ORAL at 20:23

## 2021-06-05 RX ADMIN — LOSARTAN POTASSIUM 100 MILLIGRAM(S): 100 TABLET, FILM COATED ORAL at 05:31

## 2021-06-05 RX ADMIN — ATORVASTATIN CALCIUM 80 MILLIGRAM(S): 80 TABLET, FILM COATED ORAL at 20:23

## 2021-06-05 NOTE — PROGRESS NOTE ADULT - SUBJECTIVE AND OBJECTIVE BOX
KVNGISH MCCRAY  46y  Male    Patient is a 46y old  Male who presents with a chief complaint of 3.3 cm hematoma centered in the right basal ganglia. Infarction right caudate head. (05 Jun 2021 08:43)      HPI:  45 y/o M with PMhx of HTN, takes one medication which he does not remember the name of, no anticoagulation including no aspirin, but notes compliance with his medication. Patient also mentions the use of 4-5 beers per night. 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 EMS called in stroke notification and code stroke was called prior to arrival, taken immediately to CT scan. As per patient denies headache, blurry vison. Notes more trouble moving LUE than moving LLE, no neck pain, fever, infectious symptoms. Denies drug use. (02 Jun 2021 15:37)      PAST MEDICAL & SURGICAL HISTORY:      Home Medications:  losartan 100 mg oral tablet: 1 tab(s) orally once a day (03 Jun 2021 13:39)  Ozempic: 0.5 milligram(s) subcutaneous once a week (04 Jun 2021 13:57)      46y    FAMILY HISTORY:      Marital Status:  (   )    (   ) Single    (   )    (  )   Lives with: (  ) alone  (  ) children   (  ) spouse   (  ) parents  (  ) other  Recent Travel: No recent travel  Occupation:    Substance Use (street drugs): ( x ) never used  (  ) other:  Tobacco Usage:  ( x  ) never smoked   (   ) former smoker   (   ) current smoker  (     ) pack year  Alcohol Usage: None       MEDICATIONS  (STANDING):  amLODIPine   Tablet 5 milliGRAM(s) Oral at bedtime  atorvastatin 80 milliGRAM(s) Oral at bedtime  bisacodyl 5 milliGRAM(s) Oral at bedtime  losartan 100 milliGRAM(s) Oral daily  senna 2 Tablet(s) Oral at bedtime    MEDICATIONS  (PRN):  acetaminophen   Tablet .. 650 milliGRAM(s) Oral every 6 hours PRN Mild Pain (1 - 3)  hydrALAZINE Injectable 10 milliGRAM(s) IV Push every 2 hours PRN SBP > 140    REVIEW OF SYSTEMS:  CONSTITUTIONAL: No fever, weight loss, or fatigue  EYES: No eye pain, visual disturbances, or discharge  ENMT:  No difficulty hearing, tinnitus, vertigo; No sinus or throat pain  NECK: No pain or stiffness  BREASTS: No pain, masses, or nipple discharge  RESPIRATORY: No cough, wheezing, chills or hemoptysis; No shortness of breath  CARDIOVASCULAR: No chest pain, palpitations, dizziness, or leg swelling  GASTROINTESTINAL: No abdominal or epigastric pain. No nausea, vomiting, or hematemesis; No diarrhea or constipation. No melena or hematochezia.  GENITOURINARY: No dysuria, frequency, hematuria, or incontinence  NEUROLOGICAL: No headaches, memory loss, loss of strength, numbness, or tremors  SKIN: No itching, burning, rashes, or lesions   LYMPH NODES: No enlarged glands  ENDOCRINE: No heat or cold intolerance; No hair loss  MUSCULOSKELETAL: No joint pain or swelling; No muscle, back, or extremity pain  PSYCHIATRIC: No depression, anxiety, mood swings, or difficulty sleeping    Vital Signs Last 24 Hrs  T(C): 36.4 (05 Jun 2021 18:00), Max: 37.2 (05 Jun 2021 09:09)  T(F): 97.5 (05 Jun 2021 18:00), Max: 99 (05 Jun 2021 09:09)  HR: 100 (05 Jun 2021 16:15) (64 - 100)  BP: 149/79 (05 Jun 2021 16:15) (127/65 - 178/126)  BP(mean): 105 (05 Jun 2021 16:15) (90 - 143)  RR: 18 (05 Jun 2021 16:15) (18 - 18)  SpO2: 93% (05 Jun 2021 16:15) (92% - 98%)    PHYSICAL EXAM:  GENERAL: NAD, well-groomed, well-developed  HEAD:  Atraumatic, Normocephalic  EYES: EOMI, PERRLA, conjunctiva and sclera clear  ENMT: No tonsillar erythema, exudates, or enlargement; Moist mucous membranes, Good dentition, No lesions  NECK: Supple, No JVD, Normal thyroid  NERVOUS SYSTEM:  Alert & Oriented X3, Good concentration; Motor Strength 5/5 B/L upper and lower extremities; DTRs 2+ intact and symmetric  CHEST/LUNG: Clear to percussion bilaterally; No rales, rhonchi, wheezing, or rubs  HEART: Regular rate and rhythm; No murmurs, rubs, or gallops  ABDOMEN: Soft, Nontender, Nondistended; Bowel sounds present  EXTREMITIES:  2+ Peripheral Pulses, No clubbing, cyanosis, or edema  LYMPH: No lymphadenopathy noted  SKIN: No rashes or lesions    Consultant(s) Notes Reviewed:  [x ] YES  [ ] NO  Care Discussed with Consultants/Other Providers [ x] YES  [ ] NO    LABS:                        15.7   9.49  )-----------( 287      ( 05 Jun 2021 06:33 )             49.1     06-05    140  |  106  |  16  ----------------------------<  105<H>  4.2   |  22  |  1.00    Ca    9.5      05 Jun 2021 06:33  Phos  3.0     06-04  Mg     2.2     06-04    TPro  7.5  /  Alb  4.1  /  TBili  0.4  /  DBili  <0.2  /  AST  33  /  ALT  48<H>  /  AlkPhos  91  06-04        CAPILLARY BLOOD GLUCOSE            RADIOLOGY & ADDITIONAL TESTS:  Echo:        LVSF:        EF:        RVSF:        LA:        RA:        Mitral Valve:        Aortic Valve:       Tricuspid Valve:        Pulmonic Valve:        Pericardium:   Imaging Personally Reviewed:  [ ] YES  [ ] NO           KVNGISH MCCRAY  46y  Male    Patient is a 46y old  Male who presents with a chief complaint of 3.3 cm hematoma centered in the right basal ganglia. Infarction right caudate head. (05 Jun 2021 08:43)      HPI:  45 y/o M with PMhx of HTN, takes one medication which he does not remember the name of, no anticoagulation including no aspirin, but notes compliance with his medication. Patient also mentions the use of 4-5 beers per night. 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 EMS called in stroke notification and code stroke was called prior to arrival, taken immediately to CT scan. As per patient denies headache, blurry vison. Notes more trouble moving LUE than moving LLE, no neck pain, fever, infectious symptoms. Denies drug use. (02 Jun 2021 15:37)      PAST MEDICAL & SURGICAL HISTORY:      Home Medications:  losartan 100 mg oral tablet: 1 tab(s) orally once a day (03 Jun 2021 13:39)  Ozempic: 0.5 milligram(s) subcutaneous once a week (04 Jun 2021 13:57)      46y    FAMILY HISTORY:      Marital Status:  (   )    (   ) Single    (   )    (  )   Lives with: (  ) alone  (  ) children   (  ) spouse   (  ) parents  (  ) other  Recent Travel: No recent travel  Occupation:    Substance Use (street drugs): ( x ) never used  (  ) other:  Tobacco Usage:  ( x  ) never smoked   (   ) former smoker   (   ) current smoker  (     ) pack year  Alcohol Usage: None       MEDICATIONS  (STANDING):  amLODIPine   Tablet 5 milliGRAM(s) Oral at bedtime  atorvastatin 80 milliGRAM(s) Oral at bedtime  bisacodyl 5 milliGRAM(s) Oral at bedtime  losartan 100 milliGRAM(s) Oral daily  senna 2 Tablet(s) Oral at bedtime    MEDICATIONS  (PRN):  acetaminophen   Tablet .. 650 milliGRAM(s) Oral every 6 hours PRN Mild Pain (1 - 3)  hydrALAZINE Injectable 10 milliGRAM(s) IV Push every 2 hours PRN SBP > 140    REVIEW OF SYSTEMS:  CONSTITUTIONAL: No fever, weight loss, or fatigue  EYES: No eye pain, visual disturbances, or discharge  ENMT:  No difficulty hearing, tinnitus, vertigo; No sinus or throat pain  NECK: No pain or stiffness  BREASTS: No pain, masses, or nipple discharge  RESPIRATORY: No cough, wheezing, chills or hemoptysis; No shortness of breath  CARDIOVASCULAR: No chest pain, palpitations, dizziness, or leg swelling  GASTROINTESTINAL: No abdominal or epigastric pain. No nausea, vomiting, or hematemesis; No diarrhea or constipation. No melena or hematochezia.  GENITOURINARY: No dysuria, frequency, hematuria, or incontinence  NEUROLOGICAL: No headaches, memory loss, loss of strength, numbness, or tremors,facial weakness  SKIN: No itching, burning, rashes, or lesions   LYMPH NODES: No enlarged glands  ENDOCRINE: No heat or cold intolerance; No hair loss  MUSCULOSKELETAL: No joint pain or swelling; No muscle, back, or extremity pain  PSYCHIATRIC: No depression, anxiety, mood swings, or difficulty sleeping    Vital Signs Last 24 Hrs  T(C): 36.4 (05 Jun 2021 18:00), Max: 37.2 (05 Jun 2021 09:09)  T(F): 97.5 (05 Jun 2021 18:00), Max: 99 (05 Jun 2021 09:09)  HR: 100 (05 Jun 2021 16:15) (64 - 100)  BP: 149/79 (05 Jun 2021 16:15) (127/65 - 178/126)  BP(mean): 105 (05 Jun 2021 16:15) (90 - 143)  RR: 18 (05 Jun 2021 16:15) (18 - 18)  SpO2: 93% (05 Jun 2021 16:15) (92% - 98%)    PHYSICAL EXAM:  GENERAL: NAD, well-groomed, well-developed  HEAD:  Atraumatic, Normocephalic  EYES: EOMI, PERRLA, conjunctiva and sclera clear  ENMT: No tonsillar erythema, exudates, or enlargement; Moist mucous membranes, Good dentition, No lesions  NECK: Supple, No JVD, Normal thyroid  NERVOUS SYSTEM:  Alert & Oriented X3, Good concentration; Motor Strength , left facial weakness,( refer to neuro consult exam)  CHEST/LUNG: Clear to percussion bilaterally; No rales, rhonchi, wheezing, or rubs  HEART: Regular rate and rhythm; No murmurs, rubs, or gallops  ABDOMEN: Soft, Nontender, Nondistended; Bowel sounds present  EXTREMITIES:  2+ Peripheral Pulses, No clubbing, cyanosis, or edema  LYMPH: No lymphadenopathy noted  SKIN: No rashes or lesions    Consultant(s) Notes Reviewed:  [x ] YES  [ ] NO  Care Discussed with Consultants/Other Providers [ x] YES  [ ] NO    LABS:                        15.7   9.49  )-----------( 287      ( 05 Jun 2021 06:33 )             49.1     06-05    140  |  106  |  16  ----------------------------<  105<H>  4.2   |  22  |  1.00    Ca    9.5      05 Jun 2021 06:33  Phos  3.0     06-04  Mg     2.2     06-04    TPro  7.5  /  Alb  4.1  /  TBili  0.4  /  DBili  <0.2  /  AST  33  /  ALT  48<H>  /  AlkPhos  91  06-04        CAPILLARY BLOOD GLUCOSE            RADIOLOGY & ADDITIONAL TESTS:  Echo:        LVSF:        EF:        RVSF:        LA:        RA:        Mitral Valve:        Aortic Valve:       Tricuspid Valve:        Pulmonic Valve:        Pericardium:   Imaging Personally Reviewed:  [ ] YES  [ ] NO

## 2021-06-05 NOTE — PROGRESS NOTE ADULT - REASON FOR ADMISSION
3.3 cm hematoma centered in the right basal ganglia. Infarction right caudate head.

## 2021-06-05 NOTE — PROGRESS NOTE ADULT - ASSESSMENT
45 yo M w/ PMH of HTN (on unknown medication), who presented for LVO stroke code for slurred speech, left sided weakness, with symptom onset 6/2/2021 1325. In the ED, patient hypertensive to the 220s/110s and was started on cardene drip. HCT showed 3.3 cm hematoma centered in the R basal ganglia and CTA concerning for left V4 stenosis. No surgical intervention per neurosurgery. Patient on stroke unit for medical management of bleed.      Neuro  #Right putamen hemorrhage possibly 2/2 uncontrolled HTN, asymptomatic old right caudate lacune, b/l V4 mild stenosis/athero  - q4hr stroke neuro checks and vitals  - hold aspirin for now as pt with hemorrhage, pending clearance from nsgy (likely in 1 week)   - obtain MRI brain with and without contrast per neurosurgery   - continue atorvastatin 80 mg daily  - Stroke education    Cards  #HTN  - strict BP control, Goal -140  - Continue home Lostartan 100 mg  - TTE with bubble - EF 75%, mild conc. LVH, no PFO  - Stroke Code EKG Results: NSR with PACs     #HLD  - high dose statin as above in CVA  - LDL results: 161    Pulm  - call provider if SPO2 < 94%    GI  #Nutrition/Fluids/Electrolytes   - replete K<4 and Mg <2  - Diet: Regular  - IVF: none    Infectious Disease  - Stroke Code CXR results: Cardiomegaly/cardiac magnification    Endocrine  #DM  - A1C results: 6.1  - TSH results: 1.570    DVT Prophylaxis  - SCDs for DVT prophylaxis     Dispo: home with outpatient PT/OT     Discussed daily hospital plans and goals with patient and wife at bedside in detail.  45 yo M w/ PMH of HTN (on unknown medication), who presented for LVO stroke code for slurred speech, left sided weakness, with symptom onset 6/2/2021 1325. In the ED, patient hypertensive to the 220s/110s and was started on cardene drip. HCT showed 3.3 cm hematoma centered in the R basal ganglia and CTA concerning for left V4 stenosis. No surgical intervention per neurosurgery. Patient on stroke unit for medical management of bleed.      Neuro  #Right putamen hemorrhage possibly 2/2 uncontrolled HTN, asymptomatic old right caudate lacune, left V4 stenosis/athero  - q4hr stroke neuro checks and vitals  - hold aspirin for now as pt with hemorrhage, pending clearance from nsgy (likely in 1 week)   - obtain MRI brain with and without contrast per neurosurgery   - continue atorvastatin 80 mg daily  - Stroke education    Cards  #HTN  - strict BP control, Goal -140  - Continue home Lostartan 100 mg  - TTE with bubble - EF 75%, mild conc. LVH, no PFO  - Stroke Code EKG Results: NSR with PACs     #HLD  - high dose statin as above in CVA  - LDL results: 161    Pulm  - call provider if SPO2 < 94%    GI  #Nutrition/Fluids/Electrolytes   - replete K<4 and Mg <2  - Diet: Regular  - IVF: none    Infectious Disease  - Stroke Code CXR results: Cardiomegaly/cardiac magnification    Endocrine  #DM  - A1C results: 6.1  - TSH results: 1.570    DVT Prophylaxis  - SCDs for DVT prophylaxis     Dispo: home with outpatient PT/OT     Discussed daily hospital plans and goals with patient and wife at bedside in detail.  47 yo M w/ PMH of HTN (on unknown medication), who presented for LVO stroke code for slurred speech, left sided weakness, with symptom onset 6/2/2021 1325. In the ED, patient hypertensive to the 220s/110s and was started on cardene drip. HCT showed 3.3 cm hematoma centered in the R basal ganglia and CTA concerning for left V4 stenosis. No surgical intervention per neurosurgery. Patient on stroke unit for medical management of bleed.      Neuro  #Right putamen hemorrhage possibly 2/2 uncontrolled HTN, asymptomatic old right caudate lacune, left V4 stenosis/athero  - q4hr stroke neuro checks and vitals  - hold aspirin for now as pt with hemorrhage, pending clearance from nsgy (likely in 1 week)   - obtain MRI brain with and without contrast per neurosurgery   - continue atorvastatin 80 mg daily  - Stroke education    Cards  #HTN  - strict BP control, Goal -140  - Continue home Lostartan 100 mg in the morning  - Start Norvasc 5 mg nightly   - TTE with bubble - EF 75%, mild conc. LVH, no PFO  - Stroke Code EKG Results: NSR with PACs     #HLD  - high dose statin as above in CVA  - LDL results: 161    Pulm  - call provider if SPO2 < 94%    GI  #Nutrition/Fluids/Electrolytes   - replete K<4 and Mg <2  - Diet: Regular  - IVF: none    Infectious Disease  - Stroke Code CXR results: Cardiomegaly/cardiac magnification    Endocrine  #DM  - A1C results: 6.1  - TSH results: 1.570    DVT Prophylaxis  - SCDs for DVT prophylaxis     Dispo: home with outpatient PT/OT     Discussed daily hospital plans and goals with patient and wife at bedside in detail.

## 2021-06-05 NOTE — CHART NOTE - NSCHARTNOTEFT_GEN_A_CORE
Patient's wife So called the stroke phone this evening around 20:00. She was asking if patient's official MRI report has come back yet. Explained that we are still waiting on report. Spoke to her at length regarding the next steps upon discharge. Let her know that she will be called with the final MRI report and that she will be called in advance tomorrow prior to discharge because she lives in [facility] and needs time to get to the hospital. All questions and concerns were addressed.
Neurosurgical Indications for Screening Dopplers on Admission:       1) Known hypercoagulation disorder (h/o VTE, thrombophilia, HIT, etc.)   2) Admitted from prolonged stay from another institution (straight forward ER transfers not included)  3) Presenting with significant leg immobility   4) Presenting with signs and symptoms of VTE?    5) With significant critical illness, Including "found down" for unknown period of time in HPI  6) With significant neurotrauma (TBI, SCI / TLS spine fractures)   7) Who are comatose   8) With known malignancy (e.g. glioblastoma multiforme, meningioma, etc.). Excludes IA chemo 23hr observation stays  9) On hemodialysis   10) Who have received platelet transfusion or antithrombotic reversal agents recently   11) Who have had recent major orthopedic surgery          Screening dopplers indicated?   Y _   N x    DVT Prophylaxis:  x SCD's   _ chemoprophylaxis

## 2021-06-05 NOTE — PROGRESS NOTE ADULT - ATTENDING COMMENTS
5 yo M with h/o htn presented with  slurred speech, left sided weakness and admitted for hypertensive emergency with bp  220s/110s and was started on cardene drip. CT head  showed 3.3 cm hematoma centered in the R basal ganglia and CTA concerning for left V4 stenosis.Neurosurgery advised no intervention and pt is being monitored on neuro floor for medical management.  Pt denies any new complaints and signs of increased icp    Assesment and plan    #Right putamen hemorrhage due to uncontrolled htn   asymptomatic old right caudate lacune, left V4 stenosis/athero  Plan as per Neuro and neurosurgery  Aspirin on hold  Neurochecks  rpt mri as per neurosurgery  stroke core measures  cont high dose statins  pt eval      #Hypertensive Emergency  As per neuro  - strict BP control, Goal -140  cont  Lostartan 100 mg    Norvasc 5 mg nightly       #HLD  - high dose statin    Pre diabetes: A1c 6.1  diet and exercise    - SCDs for DVT prophylaxis
hypertensive hemorrhage
Patient seen and examined by me 6/3/2021. Repeat CT head stable overnight. Awake alert, follows commands, left facial droop, left arm 3to4/5 strength. No neurosurgical intervention planned. Will transfer to stroke neurology service for further care. Recommend MRI brain with and without contrast prior to discharge.    Yoav Ledesma M.D.
as above

## 2021-06-05 NOTE — PROGRESS NOTE ADULT - SUBJECTIVE AND OBJECTIVE BOX
Neurology Stroke Progress Note    INTERVAL HPI/OVERNIGHT EVENTS: No acute events overnight. Patient seen and examined. Patient reports having left shoulder pain this morning, believes he may have pulled a muscle while lifting his arm during this admission. Otherwise denies headache, blurry vision, chest pain, nausea, vomiting, abdominal pain, extremity numbness, unsteady gait.     MEDICATIONS  (STANDING):  atorvastatin 80 milliGRAM(s) Oral at bedtime  bisacodyl 5 milliGRAM(s) Oral at bedtime  losartan 100 milliGRAM(s) Oral daily  senna 2 Tablet(s) Oral at bedtime    MEDICATIONS  (PRN):  acetaminophen   Tablet .. 650 milliGRAM(s) Oral every 6 hours PRN Mild Pain (1 - 3)  hydrALAZINE Injectable 10 milliGRAM(s) IV Push every 2 hours PRN SBP > 140    Allergies  No Known Allergies    Vital Signs Last 24 Hrs  T(C): 36.8 (05 Jun 2021 05:54), Max: 37.2 (04 Jun 2021 18:20)  T(F): 98.3 (05 Jun 2021 05:54), Max: 99 (04 Jun 2021 18:20)  HR: 96 (05 Jun 2021 07:38) (64 - 100)  BP: 140/87 (05 Jun 2021 07:37) (127/74 - 177/87)  BP(mean): 109 (05 Jun 2021 07:37) (93 - 124)  RR: 18 (05 Jun 2021 07:37) (18 - 18)  SpO2: 93% (05 Jun 2021 07:38) (92% - 100%)    Physical exam:  General: No acute distress, awake and alert  Eyes: Anicteric sclerae, moist conjunctivae  Neck: trachea midline, FROM, supple  Cardiovascular: Regular rate and rhythm, no rubs or gallops.   Pulmonary: Anterior breath sounds clear bilaterally, no crackles or wheezing. No use of accessory muscles  GI: Abdomen soft, non-distended, non-tender  Extremities: Distal pulses intact, no edema    Neurologic:  -Mental status: Awake, alert, oriented to person, place, and time. Speech is fluent with intact naming, repetition, and comprehension, no dysarthria. Recent and remote memory intact. Follows commands. Attention/concentration intact. Fund of knowledge appropriate.  -Cranial nerves:   II: Visual fields are full to confrontation.  III, IV, VI: Extraocular movements are intact without nystagmus. Pupils equally round and reactive to light  V:  Facial sensation V1-V3 equal and intact   VII: Face is symmetric with normal eye closure and smile  VIII: Gross hearing is intact  IX, X: Uvula is midline and soft palate rises symmetrically  XI: Head turning and shoulder shrug are intact.  XII: Tongue protrudes midline  Motor: Normal bulk and tone. No pronator drift. Strength in right upper, right lower, and left lower extremities 5/5. Left upper extremity strength and ROM limited by shoulder pain.   Sensation: Intact to light touch bilaterally. No neglect or extinction on double simultaneous testing.  Coordination: No dysmetria on finger-to-nose bilaterally  Reflexes: Downgoing toes bilaterally     LABS:                        15.7   9.49  )-----------( 287      ( 05 Jun 2021 06:33 )             49.1     140  |  106  |  16  ----------------------------<  105<H>  4.2   |  22  |  1.00    Ca    9.5      05 Jun 2021 06:33  Phos  3.0     06-04  Mg     2.2     06-04    TPro  7.5  /  Alb  4.1  /  TBili  0.4  /  DBili  <0.2  /  AST  33  /  ALT  48<H>  /  AlkPhos  91  06-04 Neurology Stroke Progress Note    INTERVAL HPI/OVERNIGHT EVENTS: Overnight, SBP >140 mmHg, so patient given Hydralazine 10 mg x 1 as well as Losartan 100 mg earlier than scheduled time with improvement of SBP to 140 mmHg. Patient seen and examined. Patient reports having left shoulder pain this morning, believes he may have pulled a muscle while lifting his arm during this admission. Otherwise denies headache, blurry vision, chest pain, nausea, vomiting, abdominal pain, extremity numbness, unsteady gait.     MEDICATIONS  (STANDING):  atorvastatin 80 milliGRAM(s) Oral at bedtime  bisacodyl 5 milliGRAM(s) Oral at bedtime  losartan 100 milliGRAM(s) Oral daily  senna 2 Tablet(s) Oral at bedtime    MEDICATIONS  (PRN):  acetaminophen   Tablet .. 650 milliGRAM(s) Oral every 6 hours PRN Mild Pain (1 - 3)  hydrALAZINE Injectable 10 milliGRAM(s) IV Push every 2 hours PRN SBP > 140    Allergies  No Known Allergies    Vital Signs Last 24 Hrs  T(C): 36.8 (05 Jun 2021 05:54), Max: 37.2 (04 Jun 2021 18:20)  T(F): 98.3 (05 Jun 2021 05:54), Max: 99 (04 Jun 2021 18:20)  HR: 96 (05 Jun 2021 07:38) (64 - 100)  BP: 140/87 (05 Jun 2021 07:37) (127/74 - 177/87)  BP(mean): 109 (05 Jun 2021 07:37) (93 - 124)  RR: 18 (05 Jun 2021 07:37) (18 - 18)  SpO2: 93% (05 Jun 2021 07:38) (92% - 100%)    Physical exam:  General: No acute distress, awake and alert  Eyes: Anicteric sclerae, moist conjunctivae  Neck: trachea midline, FROM, supple  Cardiovascular: Regular rate and rhythm, no rubs or gallops.   Pulmonary: Anterior breath sounds clear bilaterally, no crackles or wheezing. No use of accessory muscles  GI: Abdomen soft, non-distended, non-tender  Extremities: Distal pulses intact, no edema    Neurologic:  -Mental status: Awake, alert, oriented to person, place, and time. Speech is fluent with intact naming, repetition, and comprehension, no dysarthria. Recent and remote memory intact. Follows commands. Attention/concentration intact. Fund of knowledge appropriate.  -Cranial nerves:   II: Visual fields are full to confrontation.  III, IV, VI: Extraocular movements are intact without nystagmus. Pupils equally round and reactive to light  V:  Facial sensation V1-V3 equal and intact   VII: Face is symmetric with normal eye closure and smile  VIII: Gross hearing is intact  IX, X: Uvula is midline and soft palate rises symmetrically  XI: Head turning and shoulder shrug are intact.  XII: Tongue protrudes midline  Motor: Normal bulk and tone. No pronator drift. Strength in right upper, right lower, and left lower extremities 5/5. Left upper extremity strength and ROM limited by shoulder pain.   Sensation: Intact to light touch bilaterally. No neglect or extinction on double simultaneous testing.  Coordination: No dysmetria on finger-to-nose bilaterally  Reflexes: Downgoing toes bilaterally     LABS:                        15.7   9.49  )-----------( 287      ( 05 Jun 2021 06:33 )             49.1     140  |  106  |  16  ----------------------------<  105<H>  4.2   |  22  |  1.00    Ca    9.5      05 Jun 2021 06:33  Phos  3.0     06-04  Mg     2.2     06-04    TPro  7.5  /  Alb  4.1  /  TBili  0.4  /  DBili  <0.2  /  AST  33  /  ALT  48<H>  /  AlkPhos  91  06-04 Neurology Stroke Progress Note    INTERVAL HPI/OVERNIGHT EVENTS: Overnight, SBP >140 mmHg, so patient given Hydralazine 10 mg x 1 as well as Losartan 100 mg earlier than scheduled time with improvement of SBP to 140 mmHg. Patient seen and examined. Patient reports having left shoulder pain this morning, believes he may have pulled a muscle while lifting his arm during this admission. Otherwise denies headache, blurry vision, chest pain, nausea, vomiting, abdominal pain, extremity numbness, unsteady gait.     MEDICATIONS  (STANDING):  atorvastatin 80 milliGRAM(s) Oral at bedtime  bisacodyl 5 milliGRAM(s) Oral at bedtime  losartan 100 milliGRAM(s) Oral daily  senna 2 Tablet(s) Oral at bedtime    MEDICATIONS  (PRN):  acetaminophen   Tablet .. 650 milliGRAM(s) Oral every 6 hours PRN Mild Pain (1 - 3)  hydrALAZINE Injectable 10 milliGRAM(s) IV Push every 2 hours PRN SBP > 140    Allergies  No Known Allergies    Vital Signs Last 24 Hrs  T(C): 36.8 (05 Jun 2021 05:54), Max: 37.2 (04 Jun 2021 18:20)  T(F): 98.3 (05 Jun 2021 05:54), Max: 99 (04 Jun 2021 18:20)  HR: 96 (05 Jun 2021 07:38) (64 - 100)  BP: 140/87 (05 Jun 2021 07:37) (127/74 - 177/87)  BP(mean): 109 (05 Jun 2021 07:37) (93 - 124)  RR: 18 (05 Jun 2021 07:37) (18 - 18)  SpO2: 93% (05 Jun 2021 07:38) (92% - 100%)    Physical exam:  General: No acute distress, awake and alert  Eyes: Anicteric sclerae, moist conjunctivae  Neck: trachea midline, FROM, supple  Cardiovascular: Regular rate and rhythm, no rubs or gallops.   Pulmonary: Anterior breath sounds clear bilaterally, no crackles or wheezing. No use of accessory muscles  GI: Abdomen soft, non-distended, non-tender  Extremities: Distal pulses intact, no edema    Neurologic:  -Mental status: Awake, alert, oriented to person, place, and time. Mild dysarthria, improving from admission, with intact naming, repetition, and comprehension. Recent and remote memory intact. Follows commands. Attention/concentration intact. Fund of knowledge appropriate.  -Cranial nerves:   II: Visual fields are full to confrontation.  III, IV, VI: Extraocular movements are intact without nystagmus. Pupils equally round and reactive to light  V:  Facial sensation V1-V3 equal and intact   VII: Left facial asymmetry  VIII: Gross hearing is intact  IX, X: Uvula is midline and soft palate rises symmetrically  XI: Head turning and shoulder shrug are intact.  XII: Tongue protrudes midline  Motor: Normal bulk and tone. No pronator drift. Strength in right upper, right lower, and left lower extremities 5/5. Left upper extremity strength and ROM limited by shoulder pain.   Sensation: Intact to light touch bilaterally. No neglect or extinction on double simultaneous testing.  Coordination: No dysmetria on finger-to-nose bilaterally  Reflexes: Downgoing toes bilaterally     LABS:                        15.7   9.49  )-----------( 287      ( 05 Jun 2021 06:33 )             49.1     140  |  106  |  16  ----------------------------<  105<H>  4.2   |  22  |  1.00    Ca    9.5      05 Jun 2021 06:33  Phos  3.0     06-04  Mg     2.2     06-04    TPro  7.5  /  Alb  4.1  /  TBili  0.4  /  DBili  <0.2  /  AST  33  /  ALT  48<H>  /  AlkPhos  91  06-04

## 2021-06-06 ENCOUNTER — TRANSCRIPTION ENCOUNTER (OUTPATIENT)
Age: 46
End: 2021-06-06

## 2021-06-06 VITALS
RESPIRATION RATE: 18 BRPM | HEART RATE: 106 BPM | SYSTOLIC BLOOD PRESSURE: 139 MMHG | OXYGEN SATURATION: 95 % | DIASTOLIC BLOOD PRESSURE: 75 MMHG

## 2021-06-06 LAB
ANION GAP SERPL CALC-SCNC: 13 MMOL/L — SIGNIFICANT CHANGE UP (ref 5–17)
BASOPHILS # BLD AUTO: 0.06 K/UL — SIGNIFICANT CHANGE UP (ref 0–0.2)
BASOPHILS NFR BLD AUTO: 0.6 % — SIGNIFICANT CHANGE UP (ref 0–2)
BUN SERPL-MCNC: 20 MG/DL — SIGNIFICANT CHANGE UP (ref 7–23)
CALCIUM SERPL-MCNC: 9.7 MG/DL — SIGNIFICANT CHANGE UP (ref 8.4–10.5)
CHLORIDE SERPL-SCNC: 105 MMOL/L — SIGNIFICANT CHANGE UP (ref 96–108)
CO2 SERPL-SCNC: 20 MMOL/L — LOW (ref 22–31)
CREAT SERPL-MCNC: 0.97 MG/DL — SIGNIFICANT CHANGE UP (ref 0.5–1.3)
EOSINOPHIL # BLD AUTO: 0.2 K/UL — SIGNIFICANT CHANGE UP (ref 0–0.5)
EOSINOPHIL NFR BLD AUTO: 1.9 % — SIGNIFICANT CHANGE UP (ref 0–6)
GLUCOSE SERPL-MCNC: 110 MG/DL — HIGH (ref 70–99)
HCT VFR BLD CALC: 50.2 % — HIGH (ref 39–50)
HGB BLD-MCNC: 16.1 G/DL — SIGNIFICANT CHANGE UP (ref 13–17)
IMM GRANULOCYTES NFR BLD AUTO: 0.6 % — SIGNIFICANT CHANGE UP (ref 0–1.5)
LYMPHOCYTES # BLD AUTO: 1.92 K/UL — SIGNIFICANT CHANGE UP (ref 1–3.3)
LYMPHOCYTES # BLD AUTO: 18.6 % — SIGNIFICANT CHANGE UP (ref 13–44)
MAGNESIUM SERPL-MCNC: 2.3 MG/DL — SIGNIFICANT CHANGE UP (ref 1.6–2.6)
MCHC RBC-ENTMCNC: 27.4 PG — SIGNIFICANT CHANGE UP (ref 27–34)
MCHC RBC-ENTMCNC: 32.1 GM/DL — SIGNIFICANT CHANGE UP (ref 32–36)
MCV RBC AUTO: 85.5 FL — SIGNIFICANT CHANGE UP (ref 80–100)
MONOCYTES # BLD AUTO: 0.94 K/UL — HIGH (ref 0–0.9)
MONOCYTES NFR BLD AUTO: 9.1 % — SIGNIFICANT CHANGE UP (ref 2–14)
NEUTROPHILS # BLD AUTO: 7.17 K/UL — SIGNIFICANT CHANGE UP (ref 1.8–7.4)
NEUTROPHILS NFR BLD AUTO: 69.2 % — SIGNIFICANT CHANGE UP (ref 43–77)
NRBC # BLD: 0 /100 WBCS — SIGNIFICANT CHANGE UP (ref 0–0)
PHOSPHATE SERPL-MCNC: 4 MG/DL — SIGNIFICANT CHANGE UP (ref 2.5–4.5)
PLATELET # BLD AUTO: 315 K/UL — SIGNIFICANT CHANGE UP (ref 150–400)
POTASSIUM SERPL-MCNC: 4.1 MMOL/L — SIGNIFICANT CHANGE UP (ref 3.5–5.3)
POTASSIUM SERPL-SCNC: 4.1 MMOL/L — SIGNIFICANT CHANGE UP (ref 3.5–5.3)
RBC # BLD: 5.87 M/UL — HIGH (ref 4.2–5.8)
RBC # FLD: 13.7 % — SIGNIFICANT CHANGE UP (ref 10.3–14.5)
SODIUM SERPL-SCNC: 138 MMOL/L — SIGNIFICANT CHANGE UP (ref 135–145)
WBC # BLD: 10.35 K/UL — SIGNIFICANT CHANGE UP (ref 3.8–10.5)
WBC # FLD AUTO: 10.35 K/UL — SIGNIFICANT CHANGE UP (ref 3.8–10.5)

## 2021-06-06 PROCEDURE — 83036 HEMOGLOBIN GLYCOSYLATED A1C: CPT

## 2021-06-06 PROCEDURE — U0005: CPT

## 2021-06-06 PROCEDURE — 86850 RBC ANTIBODY SCREEN: CPT

## 2021-06-06 PROCEDURE — 85027 COMPLETE CBC AUTOMATED: CPT

## 2021-06-06 PROCEDURE — 80053 COMPREHEN METABOLIC PANEL: CPT

## 2021-06-06 PROCEDURE — 97161 PT EVAL LOW COMPLEX 20 MIN: CPT

## 2021-06-06 PROCEDURE — 80048 BASIC METABOLIC PNL TOTAL CA: CPT

## 2021-06-06 PROCEDURE — 36415 COLL VENOUS BLD VENIPUNCTURE: CPT

## 2021-06-06 PROCEDURE — 83735 ASSAY OF MAGNESIUM: CPT

## 2021-06-06 PROCEDURE — 97116 GAIT TRAINING THERAPY: CPT

## 2021-06-06 PROCEDURE — 96376 TX/PRO/DX INJ SAME DRUG ADON: CPT

## 2021-06-06 PROCEDURE — 96374 THER/PROPH/DIAG INJ IV PUSH: CPT

## 2021-06-06 PROCEDURE — 70496 CT ANGIOGRAPHY HEAD: CPT

## 2021-06-06 PROCEDURE — 86769 SARS-COV-2 COVID-19 ANTIBODY: CPT

## 2021-06-06 PROCEDURE — 82962 GLUCOSE BLOOD TEST: CPT

## 2021-06-06 PROCEDURE — A9585: CPT

## 2021-06-06 PROCEDURE — 85610 PROTHROMBIN TIME: CPT

## 2021-06-06 PROCEDURE — 71045 X-RAY EXAM CHEST 1 VIEW: CPT

## 2021-06-06 PROCEDURE — 96375 TX/PRO/DX INJ NEW DRUG ADDON: CPT

## 2021-06-06 PROCEDURE — 86901 BLOOD TYPING SEROLOGIC RH(D): CPT

## 2021-06-06 PROCEDURE — 70450 CT HEAD/BRAIN W/O DYE: CPT

## 2021-06-06 PROCEDURE — 80076 HEPATIC FUNCTION PANEL: CPT

## 2021-06-06 PROCEDURE — 93970 EXTREMITY STUDY: CPT

## 2021-06-06 PROCEDURE — 84100 ASSAY OF PHOSPHORUS: CPT

## 2021-06-06 PROCEDURE — 70498 CT ANGIOGRAPHY NECK: CPT

## 2021-06-06 PROCEDURE — 86900 BLOOD TYPING SEROLOGIC ABO: CPT

## 2021-06-06 PROCEDURE — 93306 TTE W/DOPPLER COMPLETE: CPT

## 2021-06-06 PROCEDURE — 70553 MRI BRAIN STEM W/O & W/DYE: CPT

## 2021-06-06 PROCEDURE — U0003: CPT

## 2021-06-06 PROCEDURE — 85730 THROMBOPLASTIN TIME PARTIAL: CPT

## 2021-06-06 PROCEDURE — 99253 IP/OBS CNSLTJ NEW/EST LOW 45: CPT

## 2021-06-06 PROCEDURE — 80061 LIPID PANEL: CPT

## 2021-06-06 PROCEDURE — 99291 CRITICAL CARE FIRST HOUR: CPT | Mod: 25

## 2021-06-06 PROCEDURE — 85025 COMPLETE CBC W/AUTO DIFF WBC: CPT

## 2021-06-06 PROCEDURE — 84443 ASSAY THYROID STIM HORMONE: CPT

## 2021-06-06 RX ORDER — SEMAGLUTIDE 0.68 MG/ML
0.5 INJECTION, SOLUTION SUBCUTANEOUS
Qty: 0 | Refills: 0 | DISCHARGE

## 2021-06-06 RX ORDER — AMLODIPINE BESYLATE 2.5 MG/1
1 TABLET ORAL
Qty: 30 | Refills: 1
Start: 2021-06-06

## 2021-06-06 RX ORDER — ATORVASTATIN CALCIUM 80 MG/1
1 TABLET, FILM COATED ORAL
Qty: 30 | Refills: 1
Start: 2021-06-06

## 2021-06-06 RX ORDER — LOSARTAN POTASSIUM 100 MG/1
1 TABLET, FILM COATED ORAL
Qty: 0 | Refills: 0 | DISCHARGE

## 2021-06-06 RX ORDER — ATORVASTATIN CALCIUM 80 MG/1
1 TABLET, FILM COATED ORAL
Qty: 30 | Refills: 0
Start: 2021-06-06

## 2021-06-06 RX ADMIN — Medication 10 MILLIGRAM(S): at 04:35

## 2021-06-06 RX ADMIN — LOSARTAN POTASSIUM 100 MILLIGRAM(S): 100 TABLET, FILM COATED ORAL at 05:54

## 2021-06-06 NOTE — DISCHARGE NOTE NURSING/CASE MANAGEMENT/SOCIAL WORK - PATIENT PORTAL LINK FT
You can access the FollowMyHealth Patient Portal offered by [facility] by registering at the following website: [email or web address]. By joining [employer]’s FollowMyHealth portal, you will also be able to view your health information using other applications (apps) compatible with our system.

## 2021-06-06 NOTE — DISCHARGE NOTE NURSING/CASE MANAGEMENT/SOCIAL WORK - NSDCFUADDAPPT_GEN_ALL_CORE_FT
Please follow up with your Primary Care doctor within 2 weeks of discharge.  Ok to follow up with Neurologist closer to home, if unable to get appointment please follow up with Dr. Hayes

## 2021-06-06 NOTE — DISCHARGE NOTE NURSING/CASE MANAGEMENT/SOCIAL WORK - NSDCPEFALRISK_GEN_ALL_CORE
----- Message from Ewelina Alvarado sent at 9/9/2019  3:46 PM CDT -----  Contact: Self  This patient re-scheduled her physical with Dr. Laureano since she missed the Epp with Elvia Parham.  Elvia has labs in already. Should we use those labs instead of new ones from Dr. Laureano?  Please respond to Josette ext. [phone]  
YES! Okay to use labs from Elvia Parham. Tried calling your ext no answer.   
Patient information on fall and injury prevention

## 2021-06-07 DIAGNOSIS — I63.9 CEREBRAL INFARCTION, UNSPECIFIED: ICD-10-CM

## 2021-06-07 PROBLEM — Z00.00 ENCOUNTER FOR PREVENTIVE HEALTH EXAMINATION: Status: ACTIVE | Noted: 2021-06-07

## 2021-06-09 DIAGNOSIS — G93.6 CEREBRAL EDEMA: ICD-10-CM

## 2021-06-09 DIAGNOSIS — R47.1 DYSARTHRIA AND ANARTHRIA: ICD-10-CM

## 2021-06-09 DIAGNOSIS — I61.8 OTHER NONTRAUMATIC INTRACEREBRAL HEMORRHAGE: ICD-10-CM

## 2021-06-09 DIAGNOSIS — I16.1 HYPERTENSIVE EMERGENCY: ICD-10-CM

## 2021-06-09 DIAGNOSIS — F10.10 ALCOHOL ABUSE, UNCOMPLICATED: ICD-10-CM

## 2021-06-09 DIAGNOSIS — G81.90 HEMIPLEGIA, UNSPECIFIED AFFECTING UNSPECIFIED SIDE: ICD-10-CM

## 2021-06-09 DIAGNOSIS — E78.5 HYPERLIPIDEMIA, UNSPECIFIED: ICD-10-CM

## 2021-06-09 DIAGNOSIS — I10 ESSENTIAL (PRIMARY) HYPERTENSION: ICD-10-CM

## 2021-06-09 DIAGNOSIS — R47.81 SLURRED SPEECH: ICD-10-CM

## 2021-06-16 ENCOUNTER — APPOINTMENT (OUTPATIENT)
Dept: NEUROLOGY | Facility: CLINIC | Age: 46
End: 2021-06-16

## 2022-05-26 NOTE — DISCHARGE NOTE PROVIDER - NSDCQMPCI_CARD_ALL_CORE
Urgent IB message sent to Jie in ENT to see if Amy is someone they can see soon and perform a biopsy.  Awaiting response.   No

## 2024-08-13 NOTE — ED ADULT NURSE NOTE - NS ED TRIAGE CLINICAL UPGRADE
Per FAX there were no changes to med, dosage, sig or quantity.   The medication(s) set up as pending and waiting for your approval.  Preferred Pharmacy has been set up and verified   
Received refill request for Topiramate  Request is approved  because refill protocol Met approval criteria  LOV and or labs were verified to be correct  Refill sent to pharmacy  
Deteriorating patient status - Patient was clinically upgraded due to deteriorating patient status.
